# Patient Record
Sex: FEMALE | Employment: OTHER | URBAN - METROPOLITAN AREA
[De-identification: names, ages, dates, MRNs, and addresses within clinical notes are randomized per-mention and may not be internally consistent; named-entity substitution may affect disease eponyms.]

---

## 2023-07-11 ENCOUNTER — OFFICE VISIT (OUTPATIENT)
Dept: PRIMARY CARE | Facility: CLINIC | Age: 88
End: 2023-07-11
Payer: MEDICARE

## 2023-07-11 VITALS
DIASTOLIC BLOOD PRESSURE: 70 MMHG | BODY MASS INDEX: 19.44 KG/M2 | RESPIRATION RATE: 14 BRPM | SYSTOLIC BLOOD PRESSURE: 140 MMHG | WEIGHT: 99 LBS | HEART RATE: 74 BPM | HEIGHT: 60 IN

## 2023-07-11 DIAGNOSIS — T14.8XXA SKIN ABRASION: ICD-10-CM

## 2023-07-11 DIAGNOSIS — E78.89 ELEVATED HIGH-DENSITY LIPOPROTEIN: ICD-10-CM

## 2023-07-11 DIAGNOSIS — H61.22 IMPACTED CERUMEN OF LEFT EAR: Primary | ICD-10-CM

## 2023-07-11 DIAGNOSIS — Z66 DNR (DO NOT RESUSCITATE): ICD-10-CM

## 2023-07-11 DIAGNOSIS — M81.0 OSTEOPOROSIS WITHOUT CURRENT PATHOLOGICAL FRACTURE, UNSPECIFIED OSTEOPOROSIS TYPE: ICD-10-CM

## 2023-07-11 DIAGNOSIS — Z91.81 AT RISK FOR FALLS: ICD-10-CM

## 2023-07-11 DIAGNOSIS — E03.8 SUBCLINICAL HYPOTHYROIDISM: ICD-10-CM

## 2023-07-11 DIAGNOSIS — R55 VASOVAGAL SYNCOPE: ICD-10-CM

## 2023-07-11 DIAGNOSIS — Z00.00 ROUTINE GENERAL MEDICAL EXAMINATION AT A HEALTH CARE FACILITY: ICD-10-CM

## 2023-07-11 PROBLEM — F41.9 ANXIETY: Status: ACTIVE | Noted: 2023-07-11

## 2023-07-11 PROBLEM — M25.539 PAIN, WRIST JOINT: Status: RESOLVED | Noted: 2023-07-11 | Resolved: 2023-07-11

## 2023-07-11 PROBLEM — K59.00 CONSTIPATION: Status: ACTIVE | Noted: 2023-07-11

## 2023-07-11 PROBLEM — K57.90 DIVERTICULOSIS: Status: RESOLVED | Noted: 2023-07-11 | Resolved: 2023-07-11

## 2023-07-11 PROBLEM — J32.9 SINUSITIS: Status: RESOLVED | Noted: 2023-07-11 | Resolved: 2023-07-11

## 2023-07-11 PROBLEM — R07.89 ATYPICAL CHEST PAIN: Status: ACTIVE | Noted: 2023-07-11

## 2023-07-11 PROBLEM — R14.1 ABDOMINAL GAS PAIN: Status: RESOLVED | Noted: 2023-07-11 | Resolved: 2023-07-11

## 2023-07-11 PROBLEM — R07.89 CHEST HEAVINESS: Status: RESOLVED | Noted: 2023-07-11 | Resolved: 2023-07-11

## 2023-07-11 PROBLEM — D64.9 ANEMIA: Status: ACTIVE | Noted: 2023-07-11

## 2023-07-11 PROBLEM — R50.9 FEVER: Status: RESOLVED | Noted: 2023-07-11 | Resolved: 2023-07-11

## 2023-07-11 PROBLEM — R35.0 FREQUENT URINATION: Status: RESOLVED | Noted: 2023-07-11 | Resolved: 2023-07-11

## 2023-07-11 PROBLEM — K04.7 DENTAL ABSCESS: Status: RESOLVED | Noted: 2023-07-11 | Resolved: 2023-07-11

## 2023-07-11 PROBLEM — M19.039 PRIMARY LOCALIZED OSTEOARTHROSIS, FOREARM: Status: ACTIVE | Noted: 2023-07-11

## 2023-07-11 PROBLEM — E78.5 HYPERLIPIDEMIA: Status: ACTIVE | Noted: 2023-07-11

## 2023-07-11 PROBLEM — R53.83 FATIGUE: Status: RESOLVED | Noted: 2023-07-11 | Resolved: 2023-07-11

## 2023-07-11 PROBLEM — M67.40 GANGLION: Status: ACTIVE | Noted: 2023-07-11

## 2023-07-11 PROBLEM — E87.1 HYPONATREMIA: Status: ACTIVE | Noted: 2023-07-11

## 2023-07-11 PROBLEM — S51.812A LACERATION OF LEFT FOREARM: Status: RESOLVED | Noted: 2023-07-11 | Resolved: 2023-07-11

## 2023-07-11 PROBLEM — R06.09 DYSPNEA ON EXERTION: Status: ACTIVE | Noted: 2023-07-11

## 2023-07-11 PROBLEM — N39.0 URINARY TRACT INFECTION: Status: RESOLVED | Noted: 2023-07-11 | Resolved: 2023-07-11

## 2023-07-11 PROBLEM — S63.509A WRIST SPRAIN: Status: RESOLVED | Noted: 2023-07-11 | Resolved: 2023-07-11

## 2023-07-11 PROBLEM — E55.9 VITAMIN D DEFICIENCY: Status: ACTIVE | Noted: 2023-07-11

## 2023-07-11 PROBLEM — H61.23 BILATERAL IMPACTED CERUMEN: Status: RESOLVED | Noted: 2023-07-11 | Resolved: 2023-07-11

## 2023-07-11 PROBLEM — K57.32 DIVERTICULITIS OF COLON: Status: ACTIVE | Noted: 2023-07-11

## 2023-07-11 PROCEDURE — 99397 PER PM REEVAL EST PAT 65+ YR: CPT | Performed by: INTERNAL MEDICINE

## 2023-07-11 PROCEDURE — 1036F TOBACCO NON-USER: CPT | Performed by: INTERNAL MEDICINE

## 2023-07-11 PROCEDURE — 1126F AMNT PAIN NOTED NONE PRSNT: CPT | Performed by: INTERNAL MEDICINE

## 2023-07-11 PROCEDURE — 1160F RVW MEDS BY RX/DR IN RCRD: CPT | Performed by: INTERNAL MEDICINE

## 2023-07-11 PROCEDURE — G0439 PPPS, SUBSEQ VISIT: HCPCS | Performed by: INTERNAL MEDICINE

## 2023-07-11 PROCEDURE — 69210 REMOVE IMPACTED EAR WAX UNI: CPT | Performed by: INTERNAL MEDICINE

## 2023-07-11 PROCEDURE — 1159F MED LIST DOCD IN RCRD: CPT | Performed by: INTERNAL MEDICINE

## 2023-07-11 PROCEDURE — 1157F ADVNC CARE PLAN IN RCRD: CPT | Performed by: INTERNAL MEDICINE

## 2023-07-11 PROCEDURE — 99214 OFFICE O/P EST MOD 30 MIN: CPT | Performed by: INTERNAL MEDICINE

## 2023-07-11 PROCEDURE — 1170F FXNL STATUS ASSESSED: CPT | Performed by: INTERNAL MEDICINE

## 2023-07-11 PROCEDURE — 93000 ELECTROCARDIOGRAM COMPLETE: CPT | Performed by: INTERNAL MEDICINE

## 2023-07-11 RX ORDER — THYROID 60 MG/1
60 TABLET ORAL
COMMUNITY
Start: 2018-01-15

## 2023-07-11 RX ORDER — CHOLECALCIFEROL (VITAMIN D3) 50 MCG
1 TABLET ORAL DAILY
COMMUNITY
Start: 2014-02-05

## 2023-07-11 RX ORDER — THYROID 60 MG/1
60 TABLET ORAL
COMMUNITY
Start: 2023-06-15

## 2023-07-11 RX ORDER — VITAMIN B COMPLEX
1 CAPSULE ORAL DAILY
COMMUNITY
Start: 2015-03-17

## 2023-07-11 RX ORDER — CALCIUM CARBONATE 300MG(750)
400 TABLET,CHEWABLE ORAL DAILY
COMMUNITY
Start: 2017-08-24

## 2023-07-11 RX ORDER — OMEGA-3/DHA/EPA/FISH OIL 300-1000MG
1 CAPSULE,DELAYED RELEASE (ENTERIC COATED) ORAL 2 TIMES DAILY
COMMUNITY
Start: 2015-06-29

## 2023-07-11 RX ORDER — LATANOPROST 50 UG/ML
SOLUTION/ DROPS OPHTHALMIC
COMMUNITY
Start: 2023-06-23

## 2023-07-11 RX ORDER — EPINEPHRINE 0.22MG
100 AEROSOL WITH ADAPTER (ML) INHALATION DAILY
COMMUNITY
Start: 2015-06-29

## 2023-07-11 ASSESSMENT — ACTIVITIES OF DAILY LIVING (ADL)
TAKING_MEDICATION: INDEPENDENT
MANAGING_FINANCES: INDEPENDENT
DRESSING: INDEPENDENT
GROCERY_SHOPPING: INDEPENDENT
DOING_HOUSEWORK: NEEDS ASSISTANCE
BATHING: INDEPENDENT

## 2023-07-11 ASSESSMENT — ENCOUNTER SYMPTOMS
OCCASIONAL FEELINGS OF UNSTEADINESS: 0
DEPRESSION: 0
LOSS OF SENSATION IN FEET: 0

## 2023-09-06 PROBLEM — H61.22 IMPACTED CERUMEN OF LEFT EAR: Status: ACTIVE | Noted: 2023-09-06

## 2023-09-06 PROBLEM — Z00.00 ROUTINE GENERAL MEDICAL EXAMINATION AT A HEALTH CARE FACILITY: Status: ACTIVE | Noted: 2023-09-06

## 2023-09-06 PROBLEM — T14.8XXA SKIN ABRASION: Status: ACTIVE | Noted: 2023-09-06

## 2023-09-06 PROBLEM — Z91.81 AT RISK FOR FALLS: Status: ACTIVE | Noted: 2023-09-06

## 2023-09-06 ASSESSMENT — ENCOUNTER SYMPTOMS
POLYDIPSIA: 0
DYSURIA: 0
ENDOCRINE NEGATIVE: 1
NEUROLOGICAL NEGATIVE: 1
DYSPHORIC MOOD: 0
ABDOMINAL PAIN: 0
VOMITING: 0
FEVER: 0
SORE THROAT: 0
DIZZINESS: 0
HEADACHES: 0
SPEECH DIFFICULTY: 0
BACK PAIN: 0
POLYPHAGIA: 0
GASTROINTESTINAL NEGATIVE: 1
TROUBLE SWALLOWING: 0
BRUISES/BLEEDS EASILY: 0
COUGH: 0
SHORTNESS OF BREATH: 0
NAUSEA: 0
ARTHRALGIAS: 0
FREQUENCY: 0
VOICE CHANGE: 0
CARDIOVASCULAR NEGATIVE: 1
UNEXPECTED WEIGHT CHANGE: 0
TREMORS: 0
PALPITATIONS: 0
APNEA: 0
HEMATURIA: 0
NUMBNESS: 0
WEAKNESS: 0
NERVOUS/ANXIOUS: 0
DECREASED CONCENTRATION: 0
SLEEP DISTURBANCE: 0
RESPIRATORY NEGATIVE: 1
FATIGUE: 0
CONFUSION: 0
MUSCULOSKELETAL NEGATIVE: 1
WOUND: 0
CONSTIPATION: 0
DIARRHEA: 0
EYES NEGATIVE: 1
LIGHT-HEADEDNESS: 0

## 2023-09-06 ASSESSMENT — PATIENT HEALTH QUESTIONNAIRE - PHQ9
2. FEELING DOWN, DEPRESSED OR HOPELESS: NOT AT ALL
1. LITTLE INTEREST OR PLEASURE IN DOING THINGS: NOT AT ALL
SUM OF ALL RESPONSES TO PHQ9 QUESTIONS 1 AND 2: 0

## 2023-09-07 NOTE — PROGRESS NOTES
Subjective   Reason for Visit: Porsha Severino is an 89 y.o. female here for a Medicare Wellness visit.               HPI  Falls 7/14, 5  Urinary urge  hand tingling fourth finger swollen  Drop off step off curb  Elbow scrape  Left hip tender  [facility] at Home    Patient Care Team:  Armani Loyd MD as PCP - General  Armani Loyd MD as PCP - [employer] PCP     Review of Systems   Constitutional:  Negative for fatigue, fever and unexpected weight change.   HENT:  Negative for dental problem, hearing loss, sore throat, tinnitus, trouble swallowing and voice change.         Dental up-to-date, hearing aids   Eyes: Negative.  Negative for visual disturbance.        Eyeglasses exam up-to-date Dr. Muniz   Respiratory: Negative.  Negative for apnea, cough and shortness of breath.    Cardiovascular: Negative.  Negative for chest pain, palpitations and leg swelling.   Gastrointestinal: Negative.  Negative for abdominal pain, constipation, diarrhea, nausea and vomiting.   Endocrine: Negative.  Negative for polydipsia, polyphagia and polyuria.   Genitourinary: Negative.  Negative for dysuria, frequency, hematuria and urgency.        Incontinence and nocturia, wears pads   Musculoskeletal: Negative.  Negative for arthralgias, back pain and gait problem.   Skin: Negative.  Negative for rash and wound.        Dermatology Dr. Leonard   Allergic/Immunologic: Negative for immunocompromised state.   Neurological: Negative.  Negative for dizziness, tremors, speech difficulty, weakness, light-headedness, numbness and headaches.   Hematological:  Does not bruise/bleed easily.   Psychiatric/Behavioral:  Negative for confusion, decreased concentration, dysphoric mood and sleep disturbance. The patient is not nervous/anxious.        Objective   Vitals:  /70   Pulse 74   Resp 14   Ht 1.524 m (5')   Wt (!) 44.9 kg (99 lb)   BMI 19.33 kg/m²       Physical Exam  Constitutional:       General: She is not in acute  distress.     Appearance: Normal appearance. She is normal weight.   HENT:      Head: Normocephalic.      Right Ear: Hearing and tympanic membrane normal.      Left Ear: Hearing and tympanic membrane normal.      Ears:      Comments: Hearing intact speech and finger rub hearing aids  Cerumen left ear, impacted, cleared     Nose: Nose normal.      Mouth/Throat:      Mouth: Mucous membranes are moist.      Pharynx: Oropharynx is clear.   Eyes:      General: No scleral icterus.     Extraocular Movements: Extraocular movements intact.      Conjunctiva/sclera: Conjunctivae normal.   Neck:      Thyroid: No thyromegaly.      Vascular: No carotid bruit or JVD.   Cardiovascular:      Rate and Rhythm: Normal rate and regular rhythm.      Pulses: Normal pulses.      Heart sounds: Normal heart sounds. No murmur heard.  Pulmonary:      Effort: Pulmonary effort is normal.      Breath sounds: Normal breath sounds. No wheezing, rhonchi or rales.   Abdominal:      General: Abdomen is flat. Bowel sounds are normal. There is no distension.      Palpations: Abdomen is soft. There is no mass.      Tenderness: There is no abdominal tenderness. There is no right CVA tenderness, left CVA tenderness or guarding.      Hernia: No hernia is present.      Comments: Abdominal surgical scar   Musculoskeletal:         General: Normal range of motion.      Cervical back: Full passive range of motion without pain and normal range of motion. No tenderness.      Right lower leg: No edema.      Left lower leg: No edema.      Comments: Joints F ROM, T0 L0 S0   Lymphadenopathy:      Cervical: No cervical adenopathy.   Skin:     General: Skin is warm.      Capillary Refill: Capillary refill takes less than 2 seconds.      Findings: No lesion or rash.      Comments: Superficial dry abrasion left knee and elbow  Soft tissue nodule left upper arm mobile nontender   Neurological:      General: No focal deficit present.      Mental Status: She is alert and  oriented to person, place, and time.      Cranial Nerves: No cranial nerve deficit.      Sensory: No sensory deficit.      Motor: No weakness.      Coordination: Coordination normal.      Gait: Gait normal.      Deep Tendon Reflexes: Reflexes normal.   Psychiatric:         Mood and Affect: Mood normal.         Behavior: Behavior normal.         Thought Content: Thought content normal.       Data  Electrocardiogram normal sinus rhythm  Laboratory November and December 2022 reviewed  CT angio coronary arteries 4/20/2021  Stress echocardiogram February 2020  Echocardiogram February 2020  Ambulatory heart monitor 2017-normal  Colonoscopy December 2008  Bone densitometry December 2019 osteoporosis  Mammogram July 2013  Colonoscopy 12/2/2008  Geriatrics evaluation 2014    Assessment/Plan     No clinical evidence of active heart disease, cardiac risk counseling  Age and gender appropriate cancer screening and prevention reviewed, deferred mammogram  Immunizations reviewed, declined  At risk for falls with osteoporosis-reviewed falls precautions, maintain adequate calcium and vitamin D intake weightbearing exercise  Subclinical hypothyroidism-clinically euthyroid  Cerumen impaction resolved  Problem List Items Addressed This Visit       DNR (do not resuscitate)    Elevated high-density lipoprotein    Osteoporosis    Subclinical hypothyroidism    Vasovagal syncope    Relevant Orders    ECG 12 lead (Clinic Performed) (Completed)    Routine general medical examination at a health care facility - Primary    At risk for falls    Impacted cerumen of left ear    Skin abrasion     Patient ID: Porsha Severino is a 89 y.o. female.    Ear Cerumen Removal    Date/Time: 7/11/2023 2:00 PM    Performed by: Armani Loyd MD  Authorized by: Armani Loyd MD    Consent:     Consent obtained:  Verbal  Procedure details:     Location:  L ear    Procedure type: curette      Procedure type comment:  Use of curette and irrigation    Procedure  outcomes: cerumen removed    Post-procedure details:     Inspection:  Ear canal clear    Hearing quality:  Improved    Procedure completion:  Tolerated

## 2023-12-05 ENCOUNTER — LAB (OUTPATIENT)
Dept: LAB | Facility: LAB | Age: 88
End: 2023-12-05
Payer: MEDICARE

## 2023-12-05 DIAGNOSIS — R53.83 OTHER FATIGUE: Primary | ICD-10-CM

## 2023-12-05 DIAGNOSIS — E55.9 VITAMIN D DEFICIENCY, UNSPECIFIED: ICD-10-CM

## 2023-12-05 DIAGNOSIS — N95.9 UNSPECIFIED MENOPAUSAL AND PERIMENOPAUSAL DISORDER: ICD-10-CM

## 2023-12-05 DIAGNOSIS — R52 PAIN, UNSPECIFIED: ICD-10-CM

## 2023-12-05 DIAGNOSIS — M19.90 UNSPECIFIED OSTEOARTHRITIS, UNSPECIFIED SITE: ICD-10-CM

## 2023-12-05 DIAGNOSIS — E03.9 HYPOTHYROIDISM, UNSPECIFIED: ICD-10-CM

## 2024-01-22 ENCOUNTER — TELEPHONE (OUTPATIENT)
Dept: PRIMARY CARE | Facility: CLINIC | Age: 89
End: 2024-01-22
Payer: MEDICARE

## 2024-01-23 DIAGNOSIS — N30.00 ACUTE CYSTITIS WITHOUT HEMATURIA: Primary | ICD-10-CM

## 2024-02-20 ENCOUNTER — LAB (OUTPATIENT)
Dept: LAB | Facility: LAB | Age: 89
End: 2024-02-20
Payer: MEDICARE

## 2024-02-20 DIAGNOSIS — E55.9 VITAMIN D DEFICIENCY, UNSPECIFIED: ICD-10-CM

## 2024-02-20 DIAGNOSIS — R53.83 OTHER FATIGUE: ICD-10-CM

## 2024-02-20 DIAGNOSIS — N95.9 UNSPECIFIED MENOPAUSAL AND PERIMENOPAUSAL DISORDER: ICD-10-CM

## 2024-02-20 DIAGNOSIS — E03.9 HYPOTHYROIDISM, UNSPECIFIED: ICD-10-CM

## 2024-02-20 DIAGNOSIS — R52 PAIN, UNSPECIFIED: ICD-10-CM

## 2024-02-20 DIAGNOSIS — M19.90 UNSPECIFIED OSTEOARTHRITIS, UNSPECIFIED SITE: ICD-10-CM

## 2024-02-20 DIAGNOSIS — N30.00 ACUTE CYSTITIS WITHOUT HEMATURIA: ICD-10-CM

## 2024-02-20 LAB
25(OH)D3 SERPL-MCNC: 39 NG/ML (ref 30–100)
ALBUMIN SERPL BCP-MCNC: 4.2 G/DL (ref 3.4–5)
ALP SERPL-CCNC: 96 U/L (ref 33–136)
ALT SERPL W P-5'-P-CCNC: 11 U/L (ref 7–45)
ANION GAP SERPL CALC-SCNC: 13 MMOL/L (ref 10–20)
APPEARANCE UR: CLEAR
AST SERPL W P-5'-P-CCNC: 17 U/L (ref 9–39)
BASOPHILS # BLD AUTO: 0.04 X10*3/UL (ref 0–0.1)
BASOPHILS NFR BLD AUTO: 0.8 %
BILIRUB SERPL-MCNC: 0.7 MG/DL (ref 0–1.2)
BILIRUB UR STRIP.AUTO-MCNC: NEGATIVE MG/DL
BUN SERPL-MCNC: 16 MG/DL (ref 6–23)
CA-I BLD-SCNC: 1.16 MMOL/L (ref 1.1–1.33)
CALCIUM SERPL-MCNC: 9.1 MG/DL (ref 8.6–10.6)
CHLORIDE SERPL-SCNC: 99 MMOL/L (ref 98–107)
CHOLEST SERPL-MCNC: 245 MG/DL (ref 0–199)
CHOLESTEROL/HDL RATIO: 2.5
CO2 SERPL-SCNC: 27 MMOL/L (ref 21–32)
COLOR UR: NORMAL
CORTIS SERPL-MCNC: 13.6 UG/DL (ref 2.5–20)
CREAT SERPL-MCNC: 0.81 MG/DL (ref 0.5–1.05)
CRP SERPL HS-MCNC: 4.2 MG/L
DHEA-S SERPL-MCNC: 62 UG/DL (ref 13–130)
EGFRCR SERPLBLD CKD-EPI 2021: 69 ML/MIN/1.73M*2
EOSINOPHIL # BLD AUTO: 0.13 X10*3/UL (ref 0–0.4)
EOSINOPHIL NFR BLD AUTO: 2.7 %
ERYTHROCYTE [DISTWIDTH] IN BLOOD BY AUTOMATED COUNT: 12.8 % (ref 11.5–14.5)
EST. AVERAGE GLUCOSE BLD GHB EST-MCNC: 114 MG/DL
ESTRADIOL SERPL-MCNC: <19 PG/ML
FERRITIN SERPL-MCNC: 54 NG/ML (ref 8–150)
FOLATE SERPL-MCNC: 18.6 NG/ML
GLUCOSE SERPL-MCNC: 88 MG/DL (ref 74–99)
GLUCOSE UR STRIP.AUTO-MCNC: NORMAL MG/DL
HBA1C MFR BLD: 5.6 %
HCT VFR BLD AUTO: 38.2 % (ref 36–46)
HCYS SERPL-SCNC: 16.49 UMOL/L (ref 5–13.9)
HDLC SERPL-MCNC: 99.2 MG/DL
HGB BLD-MCNC: 12 G/DL (ref 12–16)
IMM GRANULOCYTES # BLD AUTO: 0.01 X10*3/UL (ref 0–0.5)
IMM GRANULOCYTES NFR BLD AUTO: 0.2 % (ref 0–0.9)
INSULIN SERPL-ACNC: 5 UIU/ML (ref 3–25)
IRON SATN MFR SERPL: 19 % (ref 25–45)
IRON SERPL-MCNC: 73 UG/DL (ref 35–150)
KETONES UR STRIP.AUTO-MCNC: NEGATIVE MG/DL
LDLC SERPL CALC-MCNC: 130 MG/DL
LEUKOCYTE ESTERASE UR QL STRIP.AUTO: NEGATIVE
LYMPHOCYTES # BLD AUTO: 0.78 X10*3/UL (ref 0.8–3)
LYMPHOCYTES NFR BLD AUTO: 16 %
MAGNESIUM SERPL-MCNC: 2.04 MG/DL (ref 1.6–2.4)
MCH RBC QN AUTO: 27.7 PG (ref 26–34)
MCHC RBC AUTO-ENTMCNC: 31.4 G/DL (ref 32–36)
MCV RBC AUTO: 88 FL (ref 80–100)
MONOCYTES # BLD AUTO: 0.39 X10*3/UL (ref 0.05–0.8)
MONOCYTES NFR BLD AUTO: 8 %
NEUTROPHILS # BLD AUTO: 3.51 X10*3/UL (ref 1.6–5.5)
NEUTROPHILS NFR BLD AUTO: 72.3 %
NITRITE UR QL STRIP.AUTO: NEGATIVE
NON HDL CHOLESTEROL: 146 MG/DL (ref 0–149)
NRBC BLD-RTO: 0 /100 WBCS (ref 0–0)
PH UR STRIP.AUTO: 6.5 [PH]
PLATELET # BLD AUTO: 324 X10*3/UL (ref 150–450)
POTASSIUM SERPL-SCNC: 3.9 MMOL/L (ref 3.5–5.3)
PROGEST SERPL-MCNC: <0.3 NG/ML
PROT SERPL-MCNC: 6.6 G/DL (ref 6.4–8.2)
PROT UR STRIP.AUTO-MCNC: NEGATIVE MG/DL
RBC # BLD AUTO: 4.33 X10*6/UL (ref 4–5.2)
RBC # UR STRIP.AUTO: NEGATIVE /UL
SODIUM SERPL-SCNC: 135 MMOL/L (ref 136–145)
SP GR UR STRIP.AUTO: 1.01
T3FREE SERPL-MCNC: 5.1 PG/ML (ref 2.3–4.2)
T4 FREE SERPL-MCNC: 1.05 NG/DL (ref 0.78–1.48)
THYROPEROXIDASE AB SERPL-ACNC: <28 IU/ML
TIBC SERPL-MCNC: 389 UG/DL (ref 240–445)
TRIGL SERPL-MCNC: 79 MG/DL (ref 0–149)
TSH SERPL-ACNC: 0.07 MIU/L (ref 0.44–3.98)
UIBC SERPL-MCNC: 316 UG/DL (ref 110–370)
URATE SERPL-MCNC: 4.1 MG/DL (ref 2.3–6.7)
UROBILINOGEN UR STRIP.AUTO-MCNC: NORMAL MG/DL
VIT B12 SERPL-MCNC: 559 PG/ML (ref 211–911)
VLDL: 16 MG/DL (ref 0–40)
WBC # BLD AUTO: 4.9 X10*3/UL (ref 4.4–11.3)

## 2024-02-20 PROCEDURE — 83550 IRON BINDING TEST: CPT

## 2024-02-20 PROCEDURE — 82533 TOTAL CORTISOL: CPT

## 2024-02-20 PROCEDURE — 83090 ASSAY OF HOMOCYSTEINE: CPT

## 2024-02-20 PROCEDURE — 86376 MICROSOMAL ANTIBODY EACH: CPT

## 2024-02-20 PROCEDURE — 82330 ASSAY OF CALCIUM: CPT

## 2024-02-20 PROCEDURE — 83036 HEMOGLOBIN GLYCOSYLATED A1C: CPT

## 2024-02-20 PROCEDURE — 83735 ASSAY OF MAGNESIUM: CPT

## 2024-02-20 PROCEDURE — 86800 THYROGLOBULIN ANTIBODY: CPT

## 2024-02-20 PROCEDURE — 86141 C-REACTIVE PROTEIN HS: CPT

## 2024-02-20 PROCEDURE — 81003 URINALYSIS AUTO W/O SCOPE: CPT

## 2024-02-20 PROCEDURE — 82746 ASSAY OF FOLIC ACID SERUM: CPT

## 2024-02-20 PROCEDURE — 83525 ASSAY OF INSULIN: CPT

## 2024-02-20 PROCEDURE — 82607 VITAMIN B-12: CPT

## 2024-02-20 PROCEDURE — 85025 COMPLETE CBC W/AUTO DIFF WBC: CPT

## 2024-02-20 PROCEDURE — 84550 ASSAY OF BLOOD/URIC ACID: CPT

## 2024-02-20 PROCEDURE — 80053 COMPREHEN METABOLIC PANEL: CPT

## 2024-02-20 PROCEDURE — 84402 ASSAY OF FREE TESTOSTERONE: CPT

## 2024-02-20 PROCEDURE — 84481 FREE ASSAY (FT-3): CPT

## 2024-02-20 PROCEDURE — 84144 ASSAY OF PROGESTERONE: CPT

## 2024-02-20 PROCEDURE — 80061 LIPID PANEL: CPT

## 2024-02-20 PROCEDURE — 83540 ASSAY OF IRON: CPT

## 2024-02-20 PROCEDURE — 82728 ASSAY OF FERRITIN: CPT

## 2024-02-20 PROCEDURE — 82306 VITAMIN D 25 HYDROXY: CPT

## 2024-02-20 PROCEDURE — 84270 ASSAY OF SEX HORMONE GLOBUL: CPT

## 2024-02-20 PROCEDURE — 84432 ASSAY OF THYROGLOBULIN: CPT

## 2024-02-20 PROCEDURE — 82172 ASSAY OF APOLIPOPROTEIN: CPT

## 2024-02-20 PROCEDURE — 36415 COLL VENOUS BLD VENIPUNCTURE: CPT

## 2024-02-20 PROCEDURE — 84482 T3 REVERSE: CPT

## 2024-02-20 PROCEDURE — 84439 ASSAY OF FREE THYROXINE: CPT

## 2024-02-20 PROCEDURE — 84305 ASSAY OF SOMATOMEDIN: CPT

## 2024-02-20 PROCEDURE — 82627 DEHYDROEPIANDROSTERONE: CPT

## 2024-02-20 PROCEDURE — 82670 ASSAY OF TOTAL ESTRADIOL: CPT

## 2024-02-20 PROCEDURE — 84443 ASSAY THYROID STIM HORMONE: CPT

## 2024-02-21 LAB
LPA SERPL-MCNC: 145 MG/DL
SHBG SERPL-SCNC: 139 NMOL/L (ref 17–125)

## 2024-02-22 LAB
BILL ONLY-THYROGLOBULIN: NORMAL
IGF-I SERPL-MCNC: 148 NG/ML
IGF-I Z-SCORE SERPL: NORMAL
THYROGLOB AB SERPL-ACNC: <0.9 IU/ML (ref 0–4)
THYROGLOB SERPL-MCNC: 3.5 NG/ML (ref 1.3–31.8)
THYROGLOB SERPL-MCNC: NORMAL NG/ML (ref 1.3–31.8)

## 2024-02-24 LAB
TESTOSTERONE FREE (CHAN): 1.2 PG/ML
TESTOSTERONE,TOTAL,LC-MS/MS: 18 NG/DL (ref 2–45)

## 2024-02-25 LAB — T3REVERSE SERPL-MCNC: 13.9 NG/DL (ref 9–27)

## 2024-07-23 DIAGNOSIS — D64.9 ANEMIA, UNSPECIFIED TYPE: Primary | ICD-10-CM

## 2024-07-23 RX ORDER — THYROID 60 MG/1
60 TABLET ORAL
Qty: 90 TABLET | Refills: 3 | Status: SHIPPED | OUTPATIENT
Start: 2024-07-23

## 2024-09-09 ENCOUNTER — APPOINTMENT (OUTPATIENT)
Dept: RADIOLOGY | Facility: HOSPITAL | Age: 89
End: 2024-09-09
Payer: MEDICARE

## 2024-09-09 ENCOUNTER — HOSPITAL ENCOUNTER (OUTPATIENT)
Facility: HOSPITAL | Age: 89
Setting detail: OBSERVATION
Discharge: NURSING, REHAB OR LONG-TERM CARE | End: 2024-09-10
Attending: STUDENT IN AN ORGANIZED HEALTH CARE EDUCATION/TRAINING PROGRAM | Admitting: STUDENT IN AN ORGANIZED HEALTH CARE EDUCATION/TRAINING PROGRAM
Payer: MEDICARE

## 2024-09-09 ENCOUNTER — APPOINTMENT (OUTPATIENT)
Dept: CARDIOLOGY | Facility: HOSPITAL | Age: 89
End: 2024-09-09
Payer: MEDICARE

## 2024-09-09 DIAGNOSIS — R47.01 APHASIA: ICD-10-CM

## 2024-09-09 DIAGNOSIS — R41.82 ALTERED MENTAL STATUS, UNSPECIFIED ALTERED MENTAL STATUS TYPE: Primary | ICD-10-CM

## 2024-09-09 DIAGNOSIS — E87.1 HYPONATREMIA: ICD-10-CM

## 2024-09-09 DIAGNOSIS — G45.8 OTHER TRANSIENT CEREBRAL ISCHEMIC ATTACKS AND RELATED SYNDROMES: ICD-10-CM

## 2024-09-09 LAB
ALBUMIN SERPL BCP-MCNC: 3.8 G/DL (ref 3.4–5)
ALP SERPL-CCNC: 78 U/L (ref 33–136)
ALT SERPL W P-5'-P-CCNC: 11 U/L (ref 7–45)
ANION GAP SERPL CALC-SCNC: 16 MMOL/L (ref 10–20)
APPEARANCE UR: CLEAR
AST SERPL W P-5'-P-CCNC: 15 U/L (ref 9–39)
BASOPHILS # BLD AUTO: 0.03 X10*3/UL (ref 0–0.1)
BASOPHILS NFR BLD AUTO: 0.5 %
BILIRUB SERPL-MCNC: 1.3 MG/DL (ref 0–1.2)
BILIRUB UR STRIP.AUTO-MCNC: NEGATIVE MG/DL
BUN SERPL-MCNC: 18 MG/DL (ref 6–23)
CALCIUM SERPL-MCNC: 8.9 MG/DL (ref 8.6–10.3)
CHLORIDE SERPL-SCNC: 94 MMOL/L (ref 98–107)
CHLORIDE UR-SCNC: 38 MMOL/L
CHLORIDE/CREATININE (MMOL/G) IN URINE: 42 MMOL/G CREAT (ref 38–318)
CO2 SERPL-SCNC: 22 MMOL/L (ref 21–32)
COLOR UR: ABNORMAL
CREAT SERPL-MCNC: 0.97 MG/DL (ref 0.5–1.05)
CREAT UR-MCNC: 90.4 MG/DL (ref 20–320)
EGFRCR SERPLBLD CKD-EPI 2021: 55 ML/MIN/1.73M*2
EOSINOPHIL # BLD AUTO: 0.02 X10*3/UL (ref 0–0.4)
EOSINOPHIL NFR BLD AUTO: 0.3 %
ERYTHROCYTE [DISTWIDTH] IN BLOOD BY AUTOMATED COUNT: 12.5 % (ref 11.5–14.5)
FLUAV RNA RESP QL NAA+PROBE: NOT DETECTED
FLUBV RNA RESP QL NAA+PROBE: NOT DETECTED
GLUCOSE SERPL-MCNC: 115 MG/DL (ref 74–99)
GLUCOSE UR STRIP.AUTO-MCNC: NORMAL MG/DL
HCT VFR BLD AUTO: 36.4 % (ref 36–46)
HGB BLD-MCNC: 12.1 G/DL (ref 12–16)
HYALINE CASTS #/AREA URNS AUTO: ABNORMAL /LPF
IMM GRANULOCYTES # BLD AUTO: 0.01 X10*3/UL (ref 0–0.5)
IMM GRANULOCYTES NFR BLD AUTO: 0.2 % (ref 0–0.9)
KETONES UR STRIP.AUTO-MCNC: NEGATIVE MG/DL
LEUKOCYTE ESTERASE UR QL STRIP.AUTO: NEGATIVE
LYMPHOCYTES # BLD AUTO: 0.81 X10*3/UL (ref 0.8–3)
LYMPHOCYTES NFR BLD AUTO: 13.8 %
MAGNESIUM SERPL-MCNC: 2.1 MG/DL (ref 1.6–2.4)
MCH RBC QN AUTO: 28.7 PG (ref 26–34)
MCHC RBC AUTO-ENTMCNC: 33.2 G/DL (ref 32–36)
MCV RBC AUTO: 87 FL (ref 80–100)
MONOCYTES # BLD AUTO: 0.62 X10*3/UL (ref 0.05–0.8)
MONOCYTES NFR BLD AUTO: 10.5 %
NEUTROPHILS # BLD AUTO: 4.4 X10*3/UL (ref 1.6–5.5)
NEUTROPHILS NFR BLD AUTO: 74.7 %
NITRITE UR QL STRIP.AUTO: NEGATIVE
NRBC BLD-RTO: 0 /100 WBCS (ref 0–0)
OSMOLALITY SERPL: 275 MOSM/KG (ref 280–300)
PH UR STRIP.AUTO: 6.5 [PH]
PLATELET # BLD AUTO: 287 X10*3/UL (ref 150–450)
POTASSIUM SERPL-SCNC: 3.8 MMOL/L (ref 3.5–5.3)
POTASSIUM UR-SCNC: 45 MMOL/L
POTASSIUM/CREAT UR-RTO: 50 MMOL/G CREAT
PROT SERPL-MCNC: 6.1 G/DL (ref 6.4–8.2)
PROT UR STRIP.AUTO-MCNC: NEGATIVE MG/DL
RBC # BLD AUTO: 4.21 X10*6/UL (ref 4–5.2)
RBC # UR STRIP.AUTO: ABNORMAL /UL
RBC #/AREA URNS AUTO: ABNORMAL /HPF
SARS-COV-2 RNA RESP QL NAA+PROBE: NOT DETECTED
SODIUM SERPL-SCNC: 128 MMOL/L (ref 136–145)
SODIUM UR-SCNC: 48 MMOL/L
SODIUM/CREAT UR-RTO: 53 MMOL/G CREAT
SP GR UR STRIP.AUTO: 1.01
UROBILINOGEN UR STRIP.AUTO-MCNC: NORMAL MG/DL
WBC # BLD AUTO: 5.9 X10*3/UL (ref 4.4–11.3)
WBC #/AREA URNS AUTO: ABNORMAL /HPF

## 2024-09-09 PROCEDURE — G0378 HOSPITAL OBSERVATION PER HR: HCPCS

## 2024-09-09 PROCEDURE — 70450 CT HEAD/BRAIN W/O DYE: CPT

## 2024-09-09 PROCEDURE — 2500000004 HC RX 250 GENERAL PHARMACY W/ HCPCS (ALT 636 FOR OP/ED): Performed by: STUDENT IN AN ORGANIZED HEALTH CARE EDUCATION/TRAINING PROGRAM

## 2024-09-09 PROCEDURE — 70551 MRI BRAIN STEM W/O DYE: CPT | Performed by: STUDENT IN AN ORGANIZED HEALTH CARE EDUCATION/TRAINING PROGRAM

## 2024-09-09 PROCEDURE — 93005 ELECTROCARDIOGRAM TRACING: CPT

## 2024-09-09 PROCEDURE — 70544 MR ANGIOGRAPHY HEAD W/O DYE: CPT | Mod: 59

## 2024-09-09 PROCEDURE — 81001 URINALYSIS AUTO W/SCOPE: CPT | Performed by: STUDENT IN AN ORGANIZED HEALTH CARE EDUCATION/TRAINING PROGRAM

## 2024-09-09 PROCEDURE — 85025 COMPLETE CBC W/AUTO DIFF WBC: CPT | Performed by: STUDENT IN AN ORGANIZED HEALTH CARE EDUCATION/TRAINING PROGRAM

## 2024-09-09 PROCEDURE — 83735 ASSAY OF MAGNESIUM: CPT | Performed by: STUDENT IN AN ORGANIZED HEALTH CARE EDUCATION/TRAINING PROGRAM

## 2024-09-09 PROCEDURE — 70450 CT HEAD/BRAIN W/O DYE: CPT | Performed by: RADIOLOGY

## 2024-09-09 PROCEDURE — 70547 MR ANGIOGRAPHY NECK W/O DYE: CPT | Performed by: STUDENT IN AN ORGANIZED HEALTH CARE EDUCATION/TRAINING PROGRAM

## 2024-09-09 PROCEDURE — 2500000004 HC RX 250 GENERAL PHARMACY W/ HCPCS (ALT 636 FOR OP/ED): Performed by: NURSE PRACTITIONER

## 2024-09-09 PROCEDURE — 87636 SARSCOV2 & INF A&B AMP PRB: CPT | Performed by: STUDENT IN AN ORGANIZED HEALTH CARE EDUCATION/TRAINING PROGRAM

## 2024-09-09 PROCEDURE — 70551 MRI BRAIN STEM W/O DYE: CPT

## 2024-09-09 PROCEDURE — 71046 X-RAY EXAM CHEST 2 VIEWS: CPT

## 2024-09-09 PROCEDURE — 82436 ASSAY OF URINE CHLORIDE: CPT | Performed by: STUDENT IN AN ORGANIZED HEALTH CARE EDUCATION/TRAINING PROGRAM

## 2024-09-09 PROCEDURE — 71046 X-RAY EXAM CHEST 2 VIEWS: CPT | Performed by: RADIOLOGY

## 2024-09-09 PROCEDURE — 80053 COMPREHEN METABOLIC PANEL: CPT | Performed by: STUDENT IN AN ORGANIZED HEALTH CARE EDUCATION/TRAINING PROGRAM

## 2024-09-09 PROCEDURE — 83930 ASSAY OF BLOOD OSMOLALITY: CPT | Mod: AHULAB | Performed by: STUDENT IN AN ORGANIZED HEALTH CARE EDUCATION/TRAINING PROGRAM

## 2024-09-09 PROCEDURE — 99285 EMERGENCY DEPT VISIT HI MDM: CPT | Mod: 25

## 2024-09-09 PROCEDURE — 70547 MR ANGIOGRAPHY NECK W/O DYE: CPT

## 2024-09-09 PROCEDURE — 36415 COLL VENOUS BLD VENIPUNCTURE: CPT | Performed by: STUDENT IN AN ORGANIZED HEALTH CARE EDUCATION/TRAINING PROGRAM

## 2024-09-09 RX ORDER — ASPIRIN 81 MG/1
81 TABLET ORAL DAILY
Status: DISCONTINUED | OUTPATIENT
Start: 2024-09-10 | End: 2024-09-10 | Stop reason: HOSPADM

## 2024-09-09 RX ORDER — PANTOPRAZOLE SODIUM 40 MG/1
40 TABLET, DELAYED RELEASE ORAL
Status: DISCONTINUED | OUTPATIENT
Start: 2024-09-10 | End: 2024-09-10 | Stop reason: HOSPADM

## 2024-09-09 RX ORDER — ACETAMINOPHEN 160 MG/5ML
650 SOLUTION ORAL EVERY 4 HOURS PRN
Status: DISCONTINUED | OUTPATIENT
Start: 2024-09-09 | End: 2024-09-10 | Stop reason: HOSPADM

## 2024-09-09 RX ORDER — TIMOLOL MALEATE 5 MG/ML
1 SOLUTION/ DROPS OPHTHALMIC EVERY MORNING
Status: DISCONTINUED | OUTPATIENT
Start: 2024-09-10 | End: 2024-09-10 | Stop reason: HOSPADM

## 2024-09-09 RX ORDER — TIMOLOL MALEATE 5 MG/ML
1 SOLUTION/ DROPS OPHTHALMIC DAILY
COMMUNITY
Start: 2024-08-23

## 2024-09-09 RX ORDER — ONDANSETRON 4 MG/1
4 TABLET, FILM COATED ORAL EVERY 8 HOURS PRN
Status: DISCONTINUED | OUTPATIENT
Start: 2024-09-09 | End: 2024-09-10 | Stop reason: HOSPADM

## 2024-09-09 RX ORDER — ATORVASTATIN CALCIUM 40 MG/1
40 TABLET, FILM COATED ORAL NIGHTLY
Status: DISCONTINUED | OUTPATIENT
Start: 2024-09-09 | End: 2024-09-10 | Stop reason: HOSPADM

## 2024-09-09 RX ORDER — LATANOPROSTENE BUNOD 0.24 MG/ML
SOLUTION/ DROPS OPHTHALMIC
COMMUNITY
Start: 2024-06-14

## 2024-09-09 RX ORDER — ACETAMINOPHEN 325 MG/1
650 TABLET ORAL EVERY 4 HOURS PRN
Status: DISCONTINUED | OUTPATIENT
Start: 2024-09-09 | End: 2024-09-10 | Stop reason: HOSPADM

## 2024-09-09 RX ORDER — ONDANSETRON HYDROCHLORIDE 2 MG/ML
4 INJECTION, SOLUTION INTRAVENOUS EVERY 8 HOURS PRN
Status: DISCONTINUED | OUTPATIENT
Start: 2024-09-09 | End: 2024-09-10 | Stop reason: HOSPADM

## 2024-09-09 RX ORDER — ACETAMINOPHEN 650 MG/1
650 SUPPOSITORY RECTAL EVERY 4 HOURS PRN
Status: DISCONTINUED | OUTPATIENT
Start: 2024-09-09 | End: 2024-09-10 | Stop reason: HOSPADM

## 2024-09-09 RX ORDER — THYROID 30 MG/1
60 TABLET ORAL EVERY MORNING
Status: DISCONTINUED | OUTPATIENT
Start: 2024-09-10 | End: 2024-09-10 | Stop reason: HOSPADM

## 2024-09-09 RX ORDER — DOCUSATE SODIUM 100 MG/1
100 CAPSULE, LIQUID FILLED ORAL 2 TIMES DAILY
Status: DISCONTINUED | OUTPATIENT
Start: 2024-09-09 | End: 2024-09-10 | Stop reason: HOSPADM

## 2024-09-09 RX ORDER — SODIUM CHLORIDE, SODIUM LACTATE, POTASSIUM CHLORIDE, CALCIUM CHLORIDE 600; 310; 30; 20 MG/100ML; MG/100ML; MG/100ML; MG/100ML
75 INJECTION, SOLUTION INTRAVENOUS CONTINUOUS
Status: DISCONTINUED | OUTPATIENT
Start: 2024-09-09 | End: 2024-09-10

## 2024-09-09 RX ORDER — HEPARIN SODIUM 5000 [USP'U]/ML
5000 INJECTION, SOLUTION INTRAVENOUS; SUBCUTANEOUS EVERY 8 HOURS SCHEDULED
Status: DISCONTINUED | OUTPATIENT
Start: 2024-09-09 | End: 2024-09-10 | Stop reason: HOSPADM

## 2024-09-09 RX ORDER — POLYETHYLENE GLYCOL 3350 17 G/17G
17 POWDER, FOR SOLUTION ORAL DAILY PRN
Status: DISCONTINUED | OUTPATIENT
Start: 2024-09-09 | End: 2024-09-10 | Stop reason: HOSPADM

## 2024-09-09 RX ORDER — POLYETHYLENE GLYCOL 3350 17 G/17G
17 POWDER, FOR SOLUTION ORAL DAILY
Status: DISCONTINUED | OUTPATIENT
Start: 2024-09-09 | End: 2024-09-10 | Stop reason: HOSPADM

## 2024-09-09 ASSESSMENT — COLUMBIA-SUICIDE SEVERITY RATING SCALE - C-SSRS
1. IN THE PAST MONTH, HAVE YOU WISHED YOU WERE DEAD OR WISHED YOU COULD GO TO SLEEP AND NOT WAKE UP?: NO
6. HAVE YOU EVER DONE ANYTHING, STARTED TO DO ANYTHING, OR PREPARED TO DO ANYTHING TO END YOUR LIFE?: NO
2. HAVE YOU ACTUALLY HAD ANY THOUGHTS OF KILLING YOURSELF?: NO

## 2024-09-09 ASSESSMENT — PAIN - FUNCTIONAL ASSESSMENT: PAIN_FUNCTIONAL_ASSESSMENT: 0-10

## 2024-09-09 ASSESSMENT — PAIN SCALES - GENERAL
PAINLEVEL_OUTOF10: 0 - NO PAIN

## 2024-09-09 ASSESSMENT — ENCOUNTER SYMPTOMS: SPEECH DIFFICULTY: 1

## 2024-09-09 NOTE — ED TRIAGE NOTES
Pt arrived to ED with c/o difficulty finding words. EMS reports that family was on phone with pt and she was having difficulty finding the right words when trying to talk. Pt still having some problems with identifing objects but denies any other complaints. Pt alert and oriented x3 with some confusion.

## 2024-09-09 NOTE — H&P
"History Of Present Illness  Porsha Severino is a 89 y.o. female presenting with aphasia.  Patient was recently moved to assisted living about 2 weeks ago.  She is currently accompanied by her daughter who assists with obtaining history.  Daughter endorses that this morning the staff found patient naked in her bedroom and having difficulty getting dressed.  There is also concern that this happened yesterday.  Overall this difficulty getting dressed is new and just started.  Unable to specify what specifically what is the difficulty getting dressed.  This was between 7 to 9 AM.  Her daughter then called her on the phone around 230 today and noticed the patient was having difficulty speaking such as difficulty finding words and difficulty talking.  She also reports the patient was stuttering more and was potentially slightly slurred.  Once the daughter noticed that she immediately called the facility and requested that they check on the patient, to which she was sent to the hospital here.  The daughter also endorses she did have concerned that the patient did have a vasovagal syncopal event this past Wednesday, to which she has had for events in the past year.      On exam patient is alert, conversive, and cooperative with exam.  She is experiencing some aphasia.  While she is able to communicate, when asked certain questions such as where she is from, how many kids she has, ETC her response is moderately delayed and she does have difficulty responding.  Such as she will say \"I do not know\".  Daughter is at bedside and reports that this is new.  Patient is normally fairly alert and oriented and should be able to recall this information.  She did not know the month or the year, however daughter endorses that this is not new as she has been having some difficulty with remembering time recently.  However the difficulty with speech and answering the above questions is new.  Also during physical exam patient was " attempting to talk to this provider but was unable to communicate with what she was saying and was becoming frustrated with inability to express herself.    PMHX hypothyroid, glaucoma   Past Medical History  Past Medical History:   Diagnosis Date    Abdominal gas pain 07/11/2023    Bilateral impacted cerumen 07/11/2023    Diverticulosis of intestine, part unspecified, without perforation or abscess without bleeding 07/06/2021    Diverticulosis    Frequent urination 07/11/2023    Malignant (primary) neoplasm, unspecified (Multi)     Cancer    Other conditions influencing health status     Adenocarcinoma Of The Uterus    Pain, wrist joint 07/11/2023    Sinusitis 07/11/2023    Urinary tract infection 07/11/2023    Wrist sprain 07/11/2023       Surgical History  Past Surgical History:   Procedure Laterality Date    APPENDECTOMY  03/17/2015    Appendectomy    CATARACT EXTRACTION  06/29/2015    Cataract Surgery    CT ANGIO HEART CORONARY  4/20/2021    CT HEART CORONARY ANGIOGRAM 4/20/2021 [facility]    HYSTERECTOMY  10/30/2013    Hysterectomy    HYSTEROSCOPY  10/30/2013    Hysteroscopy With Endometrial Ablation    OTHER SURGICAL HISTORY  12/08/2018    Complete colonoscopy    OTHER SURGICAL HISTORY  06/30/2016    Mastoidectomy    TONSILLECTOMY  03/17/2015    Tonsillectomy With Adenoidectomy    TOTAL ABDOMINAL HYSTERECTOMY W/ BILATERAL SALPINGOOPHORECTOMY  03/17/2015    Total Abdominal Hysterectomy With Removal Of Both Ovaries        Social History  She reports that she has never smoked. She has never been exposed to tobacco smoke. She has never used smokeless tobacco. She reports that she does not drink alcohol and does not use drugs.    Family History  Family History   Problem Relation Name Age of Onset    COPD Mother          Allergies  Midazolam and Milk    Review of Systems   Neurological:  Positive for speech difficulty.   All other systems reviewed and are negative.       Physical Exam  Constitutional:        Appearance: Normal appearance.   Cardiovascular:      Rate and Rhythm: Normal rate and regular rhythm.      Pulses: Normal pulses.      Heart sounds: Normal heart sounds. No murmur heard.     No gallop.   Pulmonary:      Effort: Pulmonary effort is normal. No respiratory distress.      Breath sounds: Normal breath sounds. No wheezing or rhonchi.   Abdominal:      General: Abdomen is flat. Bowel sounds are normal. There is no distension.      Palpations: Abdomen is soft.      Tenderness: There is no abdominal tenderness. There is no guarding.   Skin:     General: Skin is warm.      Capillary Refill: Capillary refill takes less than 2 seconds.   Neurological:      Mental Status: She is alert.      Cranial Nerves: Dysarthria present. No cranial nerve deficit.      Motor: No pronator drift.      Coordination: Finger-Nose-Finger Test abnormal.      Comments: Abnl finger to nose to right side          Last Recorded Vitals  Blood pressure (!) 187/87, pulse 81, temperature 37 °C (98.6 °F), temperature source Oral, resp. rate 16, height 1.524 m (5'), weight 45.4 kg (100 lb), SpO2 97%.    Relevant Results  Scheduled medications  [START ON 9/10/2024] aspirin, 81 mg, oral, Daily  atorvastatin, 40 mg, oral, Nightly  docusate sodium, 100 mg, oral, BID  heparin (porcine), 5,000 Units, subcutaneous, q8h JEFE  [START ON 9/10/2024] pantoprazole, 40 mg, oral, Daily before breakfast  polyethylene glycol, 17 g, oral, Daily  sodium chloride, 1,000 mL, intravenous, Once  thyroid (pork), 60 mg, oral, Daily  timolol, 1 drop, Right Eye, BID      Continuous medications  lactated Ringer's, 75 mL/hr      PRN medications  PRN medications: acetaminophen **OR** acetaminophen **OR** acetaminophen, ondansetron **OR** ondansetron, polyethylene glycol    Results for orders placed or performed during the hospital encounter of 09/09/24 (from the past 24 hour(s))   CBC and Auto Differential   Result Value Ref Range    WBC 5.9 4.4 - 11.3 x10*3/uL    nRBC  0.0 0.0 - 0.0 /100 WBCs    RBC 4.21 4.00 - 5.20 x10*6/uL    Hemoglobin 12.1 12.0 - 16.0 g/dL    Hematocrit 36.4 36.0 - 46.0 %    MCV 87 80 - 100 fL    MCH 28.7 26.0 - 34.0 pg    MCHC 33.2 32.0 - 36.0 g/dL    RDW 12.5 11.5 - 14.5 %    Platelets 287 150 - 450 x10*3/uL    Neutrophils % 74.7 40.0 - 80.0 %    Immature Granulocytes %, Automated 0.2 0.0 - 0.9 %    Lymphocytes % 13.8 13.0 - 44.0 %    Monocytes % 10.5 2.0 - 10.0 %    Eosinophils % 0.3 0.0 - 6.0 %    Basophils % 0.5 0.0 - 2.0 %    Neutrophils Absolute 4.40 1.60 - 5.50 x10*3/uL    Immature Granulocytes Absolute, Automated 0.01 0.00 - 0.50 x10*3/uL    Lymphocytes Absolute 0.81 0.80 - 3.00 x10*3/uL    Monocytes Absolute 0.62 0.05 - 0.80 x10*3/uL    Eosinophils Absolute 0.02 0.00 - 0.40 x10*3/uL    Basophils Absolute 0.03 0.00 - 0.10 x10*3/uL   Comprehensive metabolic panel   Result Value Ref Range    Glucose 115 (H) 74 - 99 mg/dL    Sodium 128 (L) 136 - 145 mmol/L    Potassium 3.8 3.5 - 5.3 mmol/L    Chloride 94 (L) 98 - 107 mmol/L    Bicarbonate 22 21 - 32 mmol/L    Anion Gap 16 10 - 20 mmol/L    Urea Nitrogen 18 6 - 23 mg/dL    Creatinine 0.97 0.50 - 1.05 mg/dL    eGFR 55 (L) >60 mL/min/1.73m*2    Calcium 8.9 8.6 - 10.3 mg/dL    Albumin 3.8 3.4 - 5.0 g/dL    Alkaline Phosphatase 78 33 - 136 U/L    Total Protein 6.1 (L) 6.4 - 8.2 g/dL    AST 15 9 - 39 U/L    Bilirubin, Total 1.3 (H) 0.0 - 1.2 mg/dL    ALT 11 7 - 45 U/L   Magnesium   Result Value Ref Range    Magnesium 2.10 1.60 - 2.40 mg/dL   Influenza A, and B PCR   Result Value Ref Range    Flu A Result Not Detected Not Detected    Flu B Result Not Detected Not Detected   Sars-CoV-2 PCR   Result Value Ref Range    Coronavirus 2019, PCR Not Detected Not Detected   Urinalysis with Reflex Culture and Microscopic   Result Value Ref Range    Color, Urine Light-Yellow Light-Yellow, Yellow, Dark-Yellow    Appearance, Urine Clear Clear    Specific Gravity, Urine 1.013 1.005 - 1.035    pH, Urine 6.5 5.0, 5.5, 6.0,  6.5, 7.0, 7.5, 8.0    Protein, Urine NEGATIVE NEGATIVE, 10 (TRACE), 20 (TRACE) mg/dL    Glucose, Urine Normal Normal mg/dL    Blood, Urine 0.2 (2+) (A) NEGATIVE    Ketones, Urine NEGATIVE NEGATIVE mg/dL    Bilirubin, Urine NEGATIVE NEGATIVE    Urobilinogen, Urine Normal Normal mg/dL    Nitrite, Urine NEGATIVE NEGATIVE    Leukocyte Esterase, Urine NEGATIVE NEGATIVE   Urine electrolytes   Result Value Ref Range    Sodium, Urine Random 48 mmol/L    Sodium/Creatinine Ratio 53 Not established. mmol/g Creat    Potassium, Urine Random 45 mmol/L    Potassium/Creatinine Ratio 50 Not established mmol/g Creat    Chloride, Urine Random 38 mmol/L    Chloride/Creatinine Ratio 42 38 - 318 mmol/g creat    Creatinine, Urine Random 90.4 20.0 - 320.0 mg/dL   Urinalysis Microscopic   Result Value Ref Range    WBC, Urine NONE 1-5, NONE /HPF    RBC, Urine 3-5 NONE, 1-2, 3-5 /HPF    Hyaline Casts, Urine 2+ (A) NONE /LPF       MICRONODULAR GROWTH PATTERN         Assessment/Plan   Assessment & Plan  Altered mental status, unspecified altered mental status type    Porsha Severino is a 89 y.o. female presenting with aphasia.  Patient was recently moved to assisted living about 2 weeks ago.  She is currently accompanied by her daughter who assists with obtaining history.  Daughter endorses that this morning the staff found patient naked in her bedroom and having difficulty getting dressed.  There is also concern that this happened yesterday.  Overall this difficulty getting dressed is new and just started.  Unable to specify what specifically what is the difficulty getting dressed.  This was between 7 to 9 AM.  Her daughter then called her on the phone around 230 today and noticed the patient was having difficulty speaking such as difficulty finding words and difficulty talking.  She also reports the patient was stuttering more and was potentially slightly slurred.  Once the daughter noticed that she immediately called the facility and  "requested that they check on the patient, to which she was sent to the hospital here.  The daughter also endorses she did have concerned that the patient did have a vasovagal syncopal event this past Wednesday, to which she has had for events in the past year.    On exam patient is alert, conversive, and cooperative with exam.  She is experiencing some aphasia.  While she is able to communicate, when asked certain questions such as where she is from, how many kids she has, ETC her response is moderately delayed and she does have difficulty responding.  Such as she will say \"I do not know\".  Daughter is at bedside and reports that this is new.  Patient is normally fairly alert and oriented and should be able to recall this information.  She did not know the month or the year, however daughter endorses that this is not new as she has been having some difficulty with remembering time recently.  However the difficulty with speech and answering the above questions is new.  Also during physical exam patient was attempting to talk to this provider but was unable to communicate with what she was saying and was becoming frustrated with inability to express herself.    PMHX hypothyroid, glaucoma      Aphasia  -MRI brain  -MRA head/neck  -CT head neg  -echo with bubble study  -lipid panel  -telemetry  -neuro consulted, appreciate recs  -Q4 neuro checks   -when asked about where she was born, number of kids, etc there was delayed response and difficulty answering-> unclear if this is more confusion vs aphasia. However she later during exam appeared frustrated as she knew what she wanted to say but was unable to verbalize it   -given aphasia- higher concern for CVA/TIA rather than hyponatremia causing symptoms-> stroke work up ordered      Hyponatremia  - on admit  -unclear etiology, not on any diuretics, denies poor PO intake, no recent illness, diarrhea, or emesis   -IVF, recheck in AM     Hypothyroid  -Resume home " meds      DVT prophylaxis:  Heparin, SCD    DC plan:  DC home when medically stable    Labs/Testing reviewed    Interdisciplinary team rounding completed with hospitalist, nurse, TCC    NP discussed plan and lab/testing results with Dr. Obi HERRERA spent 45 minutes in the professional and overall care of this patient.      Nalini Lerner, APRN-CNP

## 2024-09-09 NOTE — HOSPITAL COURSE
"Porsha Severino is a 89 y.o. female with history of hypothyroidism and glaucoma presenting with aphasia.  Patient was recently moved to assisted living about 2 weeks ago.  She is currently accompanied by her daughter who assists with obtaining history.  Daughter endorses that this morning the staff found patient naked in her bedroom and having difficulty getting dressed.  There is also concern that this happened yesterday.  Overall this difficulty getting dressed is new and just started.  Unable to specify what specifically what is the difficulty getting dressed.  This was between 7 to 9 AM.  Her daughter then called her on the phone around 230 today and noticed the patient was having difficulty speaking such as difficulty finding words and difficulty talking.  She also reports the patient was stuttering more and was potentially slightly slurred.  Once the daughter noticed that she immediately called the facility and requested that they check on the patient, to which she was sent to the hospital here.  The daughter also endorses she did have concerned that the patient did have a vasovagal syncopal event this past Wednesday, to which she has had for events in the past year.        On exam patient is alert, conversive, and cooperative with exam.  She is experiencing some aphasia.  While she is able to communicate, when asked certain questions such as where she is from, how many kids she has, ETC her response is moderately delayed and she does have difficulty responding.  Such as she will say \"I do not know\".  Daughter is at bedside and reports that this is new.  Patient is normally fairly alert and oriented and should be able to recall this information.  She did not know the month or the year, however daughter endorses that this is not new as she has been having some difficulty with remembering time recently.  However the difficulty with speech and answering the above questions is new.  Also during physical exam " patient was attempting to talk to this provider but was unable to communicate with what she was saying and was becoming frustrated with inability to express herself.    Observation Course:  MRI ###  MRA head/neck ###  CT head negative  Echo ###  Neurology consulted. ###    Admission Na 128, given IVF and monitored. Etiology unknown.         Discharge weight: ### kg    After all labs and VS were reviewed the decision was made that the patient was medically stable for discharge.  The patient was discharged in satisfactory condition.    More than 30 minutes were spent in coordinating patient discharge.

## 2024-09-09 NOTE — ED PROVIDER NOTES
Emergency Department Provider Note        History of Present Illness     Chief Complaint: AMS  History provided by: Patient and EMS  Limitations to History: Altered Mental Status  External Chart Review: See ED Course    HPI:  Porsha Severino is a 89 y.o. female with PMHx of HLD presenting to the ED for AMS.    I personally discussed history with EMS who reports they were called for patient with altered mental status.  Patient had reportedly seemed confused versus was having word finding difficulty while on the phone with a family member who called EMS.  On their arrival, glucose normal.  Patient seemed confused but moving all extremities spontaneously.    Patient reports she has felt confused for the past day.  Denies headache, visual changes, numbness, weakness, fevers, chills, rhinorrhea, nasal congestion, sore throat, cough, chest pain, shortness of breath, abdominal pain, nausea, vomiting, diarrhea, dysuria, urinary frequency/urgency.    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  Has been having some memory issues lately as well.  Daughter reports pt has seemed forgetful, especially with short term topics. Pt seemed more confused over the past few days according to assisted living staff and was reportedly found in her apartment this morning completely naked and confused.  Daughter called her to check in and noticed that her speech seemed abnormal.    Physical Exam   Triage vitals:  T 37 °C (98.6 °F)  HR 82  /81  RR 17  O2 97 % None (Room air)    Constitutional: Awake, alert, not in acute distress  HENT: Head atraumatic, mucous membranes moist  Eyes:  Conjunctivae normal  Neck:  Supple  Lungs: Clear to auscultation, breath sounds equal and symmetric, no wheezes, rales, or rhonchi  Heart: Regular rate and rhythm, no murmur, rub or gallop  Abdomen: Soft, nontender, nondistended, no rebound or  guarding  Extremities: No lower extremity edema  Neuro: Alert and oriented to person, but not place or time, PERRL, CN 2-12 intact, strength 5/5 BUE and BLE, SILT BUE and BLE, normal FNF and PERNELL  Skin: Warm, dry  Psych: Calm, cooperative      Medical Decision Making & ED Course   Medical Decision Making:  Porsha Severino is a 89 y.o. female with PMHx as above in HPI who presented to the ED for AMS. Patient was afebrile, hemodynamically stable, and satting on room air on arrival.     Exam as above.    D stick normal on arrival.     EKG showed NSR without signs of acute ischemia.    CT head without evidence of ICH or masses.   CXR without evidence of PTX, PNA, widened mediastinum, or pulmonary edema.    Labs below in ED course, notable for CBC: no anemia, BMP: no MITCHELL , hyponatremia worsened compared to prior, LFT: no evidence of obstructive biliary pathology, no evidence of acute liver injury, UA: not suggestive of UTI, covid/flu negative.    She was given a bolus of normal saline. Her symptoms could be due to worsened hyponatremia but would benefit from admission and further workup. Admitted to medicine.   ------------------------------------------------  Independent Test Interpretation: See ED Course  Discussion with Other Providers: See ED Course    ED Course:  ED Course as of 09/09/24 2136   Mon Sep 09, 2024   4027 External chart review:  OS ED visit 4/7/24  Seen after fall     [SS]   1552 CT head wo IV contrast  I have personally reviewed and interpreted this CT head, which shows no large ICH. Final decision making pending radiology read.   [SS]   1622 CT head wo IV contrast  Radiology read:  IMPRESSION:  No acute intracranial abnormality. Consider follow-up with MRI as  warranted.   [SS]   1645 CBC and Auto Differential  No leukocytosis, anemia, or thrombocytopenia   [SS]   1645 XR chest 2 views  I have personally reviewed and interpreted this CXR, which shows no PNA or PTX. Final decision making pending  radiology read.   [SS]   1649 XR chest 2 views  Radiology read:  IMPRESSION:  Mild interstitial prominence.       [SS]   1700 ECG 12 lead  I have personally reviewed and interpreted this EKG.  Normal sinus rhythm, rate 79 BPM  Normal axis  NM interval prolonged with 1st degree AV block  QRS duration within normal limits.  QTc within normal limits.  No signs of acute ischemia or infarction   [SS]   1706 Comprehensive metabolic panel(!)  Hyponatremia, no MITCHELL, mild elevation T. bili but no signs of acute liver injury [SS]   1706 MAGNESIUM: 2.10  normal [SS]   1724 Personally discussed currently available facts and concerns about the case with medicine, who advises accepts to obs   [SS]   1725 Influenza A, and B PCR  neg [SS]   1725 Coronavirus 2019, PCR: Not Detected  neg [SS]   1738 Urinalysis with Reflex Culture and Microscopic(!)  No signs of UTI   [SS]      ED Course User Index  [SS] Steffen Simerlink, MD         Diagnoses as of 09/09/24 2136   Altered mental status, unspecified altered mental status type   Hyponatremia     Disposition   As a result of their workup, the patient will require admission to the hospital.  The patient was informed of her diagnosis.  The patient was given the opportunity to ask questions and I answered them. The patient agreed to be admitted to the hospital.    Procedures   Procedures    Steffen Simerlink, MD Steffen Simerlink, MD  09/09/24 2136

## 2024-09-10 ENCOUNTER — APPOINTMENT (OUTPATIENT)
Dept: CARDIOLOGY | Facility: HOSPITAL | Age: 89
End: 2024-09-10
Payer: MEDICARE

## 2024-09-10 VITALS
DIASTOLIC BLOOD PRESSURE: 80 MMHG | BODY MASS INDEX: 19.63 KG/M2 | RESPIRATION RATE: 17 BRPM | HEART RATE: 81 BPM | TEMPERATURE: 97.3 F | HEIGHT: 60 IN | OXYGEN SATURATION: 96 % | SYSTOLIC BLOOD PRESSURE: 154 MMHG | WEIGHT: 100 LBS

## 2024-09-10 LAB
ANION GAP SERPL CALC-SCNC: 13 MMOL/L (ref 10–20)
AORTIC VALVE MEAN GRADIENT: 2 MMHG
AORTIC VALVE PEAK VELOCITY: 0.98 M/S
AV PEAK GRADIENT: 3.8 MMHG
AVA (PEAK VEL): 1.83 CM2
AVA (VTI): 2 CM2
BASOPHILS # BLD AUTO: 0.03 X10*3/UL (ref 0–0.1)
BASOPHILS NFR BLD AUTO: 0.6 %
BUN SERPL-MCNC: 17 MG/DL (ref 6–23)
CALCIUM SERPL-MCNC: 8.5 MG/DL (ref 8.6–10.3)
CHLORIDE SERPL-SCNC: 98 MMOL/L (ref 98–107)
CO2 SERPL-SCNC: 25 MMOL/L (ref 21–32)
CREAT SERPL-MCNC: 0.8 MG/DL (ref 0.5–1.05)
EGFRCR SERPLBLD CKD-EPI 2021: 70 ML/MIN/1.73M*2
EJECTION FRACTION APICAL 4 CHAMBER: 63.9
EJECTION FRACTION: 61 %
EOSINOPHIL # BLD AUTO: 0.1 X10*3/UL (ref 0–0.4)
EOSINOPHIL NFR BLD AUTO: 2.1 %
ERYTHROCYTE [DISTWIDTH] IN BLOOD BY AUTOMATED COUNT: 12.5 % (ref 11.5–14.5)
GLUCOSE SERPL-MCNC: 99 MG/DL (ref 74–99)
HCT VFR BLD AUTO: 35.2 % (ref 36–46)
HGB BLD-MCNC: 12 G/DL (ref 12–16)
HOLD SPECIMEN: NORMAL
IMM GRANULOCYTES # BLD AUTO: 0.01 X10*3/UL (ref 0–0.5)
IMM GRANULOCYTES NFR BLD AUTO: 0.2 % (ref 0–0.9)
LEFT ATRIUM VOLUME AREA LENGTH INDEX BSA: 29 ML/M2
LEFT VENTRICLE INTERNAL DIMENSION DIASTOLE: 3.36 CM (ref 3.5–6)
LEFT VENTRICULAR OUTFLOW TRACT DIAMETER: 1.76 CM
LYMPHOCYTES # BLD AUTO: 1.02 X10*3/UL (ref 0.8–3)
LYMPHOCYTES NFR BLD AUTO: 21.4 %
MCH RBC QN AUTO: 29.3 PG (ref 26–34)
MCHC RBC AUTO-ENTMCNC: 34.1 G/DL (ref 32–36)
MCV RBC AUTO: 86 FL (ref 80–100)
MITRAL VALVE E/A RATIO: 0.55
MONOCYTES # BLD AUTO: 0.55 X10*3/UL (ref 0.05–0.8)
MONOCYTES NFR BLD AUTO: 11.5 %
NEUTROPHILS # BLD AUTO: 3.06 X10*3/UL (ref 1.6–5.5)
NEUTROPHILS NFR BLD AUTO: 64.2 %
NRBC BLD-RTO: 0 /100 WBCS (ref 0–0)
PLATELET # BLD AUTO: 268 X10*3/UL (ref 150–450)
POTASSIUM SERPL-SCNC: 3.5 MMOL/L (ref 3.5–5.3)
RBC # BLD AUTO: 4.1 X10*6/UL (ref 4–5.2)
RIGHT VENTRICLE FREE WALL PEAK S': 12 CM/S
RIGHT VENTRICLE PEAK SYSTOLIC PRESSURE: 18.5 MMHG
SODIUM SERPL-SCNC: 132 MMOL/L (ref 136–145)
T4 FREE SERPL-MCNC: 0.92 NG/DL (ref 0.61–1.12)
TRICUSPID ANNULAR PLANE SYSTOLIC EXCURSION: 1.7 CM
TSH SERPL-ACNC: 0.93 MIU/L (ref 0.44–3.98)
WBC # BLD AUTO: 4.8 X10*3/UL (ref 4.4–11.3)

## 2024-09-10 PROCEDURE — 85025 COMPLETE CBC W/AUTO DIFF WBC: CPT | Performed by: NURSE PRACTITIONER

## 2024-09-10 PROCEDURE — 80048 BASIC METABOLIC PNL TOTAL CA: CPT | Performed by: NURSE PRACTITIONER

## 2024-09-10 PROCEDURE — G0378 HOSPITAL OBSERVATION PER HR: HCPCS

## 2024-09-10 PROCEDURE — 36415 COLL VENOUS BLD VENIPUNCTURE: CPT | Performed by: NURSE PRACTITIONER

## 2024-09-10 PROCEDURE — 84443 ASSAY THYROID STIM HORMONE: CPT | Performed by: INTERNAL MEDICINE

## 2024-09-10 PROCEDURE — 93306 TTE W/DOPPLER COMPLETE: CPT | Performed by: INTERNAL MEDICINE

## 2024-09-10 PROCEDURE — 2500000001 HC RX 250 WO HCPCS SELF ADMINISTERED DRUGS (ALT 637 FOR MEDICARE OP): Performed by: NURSE PRACTITIONER

## 2024-09-10 PROCEDURE — 93306 TTE W/DOPPLER COMPLETE: CPT

## 2024-09-10 PROCEDURE — 2500000004 HC RX 250 GENERAL PHARMACY W/ HCPCS (ALT 636 FOR OP/ED): Performed by: NURSE PRACTITIONER

## 2024-09-10 PROCEDURE — 84439 ASSAY OF FREE THYROXINE: CPT | Performed by: INTERNAL MEDICINE

## 2024-09-10 PROCEDURE — 99223 1ST HOSP IP/OBS HIGH 75: CPT | Performed by: PSYCHIATRY & NEUROLOGY

## 2024-09-10 RX ORDER — ATORVASTATIN CALCIUM 40 MG/1
40 TABLET, FILM COATED ORAL NIGHTLY
Qty: 30 TABLET | Refills: 0 | Status: SHIPPED | OUTPATIENT
Start: 2024-09-10

## 2024-09-10 RX ORDER — ASPIRIN 81 MG/1
81 TABLET ORAL DAILY
Qty: 30 TABLET | Refills: 0 | Status: SHIPPED | OUTPATIENT
Start: 2024-09-11

## 2024-09-10 SDOH — SOCIAL STABILITY: SOCIAL INSECURITY: HAVE YOU HAD THOUGHTS OF HARMING ANYONE ELSE?: NO

## 2024-09-10 SDOH — SOCIAL STABILITY: SOCIAL INSECURITY: DO YOU FEEL UNSAFE GOING BACK TO THE PLACE WHERE YOU ARE LIVING?: NO

## 2024-09-10 SDOH — SOCIAL STABILITY: SOCIAL INSECURITY: DOES ANYONE TRY TO KEEP YOU FROM HAVING/CONTACTING OTHER FRIENDS OR DOING THINGS OUTSIDE YOUR HOME?: NO

## 2024-09-10 SDOH — SOCIAL STABILITY: SOCIAL INSECURITY: ABUSE: ADULT

## 2024-09-10 SDOH — SOCIAL STABILITY: SOCIAL INSECURITY: ARE THERE ANY APPARENT SIGNS OF INJURIES/BEHAVIORS THAT COULD BE RELATED TO ABUSE/NEGLECT?: NO

## 2024-09-10 SDOH — SOCIAL STABILITY: SOCIAL INSECURITY: ARE YOU OR HAVE YOU BEEN THREATENED OR ABUSED PHYSICALLY, EMOTIONALLY, OR SEXUALLY BY ANYONE?: NO

## 2024-09-10 SDOH — SOCIAL STABILITY: SOCIAL INSECURITY: DO YOU FEEL ANYONE HAS EXPLOITED OR TAKEN ADVANTAGE OF YOU FINANCIALLY OR OF YOUR PERSONAL PROPERTY?: NO

## 2024-09-10 SDOH — SOCIAL STABILITY: SOCIAL INSECURITY: HAS ANYONE EVER THREATENED TO HURT YOUR FAMILY OR YOUR PETS?: NO

## 2024-09-10 SDOH — SOCIAL STABILITY: SOCIAL INSECURITY: WERE YOU ABLE TO COMPLETE ALL THE BEHAVIORAL HEALTH SCREENINGS?: YES

## 2024-09-10 ASSESSMENT — ACTIVITIES OF DAILY LIVING (ADL)
GROOMING: NEEDS ASSISTANCE
TOILETING: NEEDS ASSISTANCE
WALKS IN HOME: NEEDS ASSISTANCE
DRESSING YOURSELF: NEEDS ASSISTANCE
LACK_OF_TRANSPORTATION: PATIENT UNABLE TO ANSWER
FEEDING YOURSELF: INDEPENDENT
HEARING - RIGHT EAR: HEARING AID
ADEQUATE_TO_COMPLETE_ADL: NO
JUDGMENT_ADEQUATE_SAFELY_COMPLETE_DAILY_ACTIVITIES: NO
BATHING: NEEDS ASSISTANCE
PATIENT'S MEMORY ADEQUATE TO SAFELY COMPLETE DAILY ACTIVITIES?: NO
HEARING - LEFT EAR: HEARING AID

## 2024-09-10 ASSESSMENT — COGNITIVE AND FUNCTIONAL STATUS - GENERAL
MOBILITY SCORE: 18
PERSONAL GROOMING: A LITTLE
DRESSING REGULAR LOWER BODY CLOTHING: A LITTLE
TOILETING: A LITTLE
CLIMB 3 TO 5 STEPS WITH RAILING: A LITTLE
HELP NEEDED FOR BATHING: A LITTLE
STANDING UP FROM CHAIR USING ARMS: A LITTLE
DRESSING REGULAR UPPER BODY CLOTHING: A LITTLE
DAILY ACTIVITIY SCORE: 19
TURNING FROM BACK TO SIDE WHILE IN FLAT BAD: A LITTLE
WALKING IN HOSPITAL ROOM: A LITTLE
PATIENT BASELINE BEDBOUND: NO
MOVING FROM LYING ON BACK TO SITTING ON SIDE OF FLAT BED WITH BEDRAILS: A LITTLE
MOVING TO AND FROM BED TO CHAIR: A LITTLE

## 2024-09-10 ASSESSMENT — PATIENT HEALTH QUESTIONNAIRE - PHQ9
2. FEELING DOWN, DEPRESSED OR HOPELESS: NOT AT ALL
SUM OF ALL RESPONSES TO PHQ9 QUESTIONS 1 & 2: 0
1. LITTLE INTEREST OR PLEASURE IN DOING THINGS: NOT AT ALL

## 2024-09-10 ASSESSMENT — LIFESTYLE VARIABLES
AUDIT-C TOTAL SCORE: 0
AUDIT-C TOTAL SCORE: 0
HOW MANY STANDARD DRINKS CONTAINING ALCOHOL DO YOU HAVE ON A TYPICAL DAY: PATIENT DOES NOT DRINK
SKIP TO QUESTIONS 9-10: 1
HOW OFTEN DO YOU HAVE 6 OR MORE DRINKS ON ONE OCCASION: NEVER
HOW OFTEN DO YOU HAVE A DRINK CONTAINING ALCOHOL: NEVER

## 2024-09-10 ASSESSMENT — PAIN SCALES - GENERAL
PAINLEVEL_OUTOF10: 0 - NO PAIN

## 2024-09-10 ASSESSMENT — PAIN - FUNCTIONAL ASSESSMENT: PAIN_FUNCTIONAL_ASSESSMENT: 0-10

## 2024-09-10 NOTE — PROGRESS NOTES
09/10/24 1436   Discharge Planning   Home or Post Acute Services Post acute facilities (Rehab/SNF/etc)   Expected Discharge Disposition GARRY     Attempted to call patient's nurse at her AL but they must have been having trouble with their phone system as it kept hanging up when they tried to transfer me to the nurse.  Will attempt to call a bit later.    1515 Spoke to patient's nurse Lissette [phone])-ok for patient to return.  Lissette has been getting updates from the patient's daughter so she feels like she has a good understanding of the tests that have been run and the results.  Did explain that patient has had a sitter but it has been more for safety today than aggression  Report number give via secure chat to Brian.

## 2024-09-10 NOTE — PROGRESS NOTES
Transitional Care Coordination Progress Note:  Plan per Medical/Surgical team: treatment of >MS, aphasia with IV fluids, neuro consult, ECHO pending  Status: Observation  Payor source: [employer]  Discharge disposition: [facility] [phone]  Potential Barriers: blind, dementia  ADOD: 9/11/2024   TERE Nuñez RN, BSN Transitional Care Coordinator ED# [phone]      09/10/24 0733   Discharge Planning   Living Arrangements Alone   Support Systems Children   Assistance Needed neuro work up   Type of Residence Assisted living   Do you have animals or pets at home? No   Care Facility Name [facility]  [address],   [place] [address]  [phone]   Home or Post Acute Services Post acute facilities (Rehab/SNF/etc)   Type of Post Acute Facility Services Assisted living   Expected Discharge Disposition GARRY   Does the patient need discharge transport arranged? Yes   RoundTrip coordination needed? Yes   Has discharge transport been arranged? No   Financial Resource Strain   How hard is it for you to pay for the very basics like food, housing, medical care, and heating? Pt Unable   Housing Stability   In the last 12 months, was there a time when you were not able to pay the mortgage or rent on time? Pt Unable   In the past 12 months, how many times have you moved where you were living? 2   At any time in the past 12 months, were you homeless or living in a shelter (including now)? Pt Unable   Transportation Needs   In the past 12 months, has lack of transportation kept you from medical appointments or from getting medications? Pt Unable   In the past 12 months, has lack of transportation kept you from meetings, work, or from getting things needed for daily living? Pt Unable

## 2024-09-10 NOTE — DISCHARGE INSTRUCTIONS
[facility] Observation Unit Discharge Instructions  You came to the hospital with speech difficulty and confusion and were admitted for observation and further care.   You were treated with CT head and MRI which were negative for stroke.   A neurology  specialist saw you while admitted and helped manage your care. There is concern symptoms are consistent with dementia,likely alzheimer's.    Your discharge plan is to go home to recover.    Please see your primary care doctor in 1 week for follow-up.   An appointment has been requested for you but may you need to call your doctors office to schedule.       For any issues or concerns with appointments, call the  scheduling line at [phone] or the providers office directly.       See the attached information for education about any new medications and the condition(s) you were treated for.  Your medications may have changed so pay close attention to the list given to you at discharge and ask any questions you have before leaving the hospital.

## 2024-09-10 NOTE — PROGRESS NOTES
[facility]  [address],   [place] [address]  [phone]     09/10/24 0733   Current Planned Discharge Disposition   Current Planned Discharge Disposition Home

## 2024-09-10 NOTE — PROGRESS NOTES
09/10/24 7388   Discharge Planning   Assistance Needed potentially needs higher level of care   Expected Discharge Disposition GARRY BRONSON called patient's daughter. Unable to reach daughter, unsure if this is correct contact number.

## 2024-09-10 NOTE — PROGRESS NOTES
09/10/24 0733   [facility] Disability Status   Are you deaf or do you have serious difficulty hearing? N   Are you blind or do you have serious difficulty seeing, even when wearing glasses? Y  (blind)   Because of a physical, mental, or emotional condition, do you have serious difficulty concentrating, remembering, or making decisions? (5 years old or older) Y  (dementia)   Do you have serious difficulty walking or climbing stairs? Y   Do you have serious difficulty dressing or bathing? Y   Because of a physical, mental, or emotional condition, do you have serious difficulty doing errands alone such as visiting the doctor? Y

## 2024-09-10 NOTE — NURSING NOTE
Discharge paperwork reviewed with pt family members, no questions verbalized. Pt family members educated about signs and symptoms of stroke and when to seek medical attention. IV removed without complications. Telemetry monitor removed. Pt discharged to [facility] via private vehicle.

## 2024-09-10 NOTE — PROGRESS NOTES
Porsha Severino is a 89 y.o. female on day 0 of admission presenting with Altered mental status, unspecified altered mental status type.    Subjective   Resting in bed. Confused. Reportedly became confused last night and required sitter which is now at bedside. On exam she still is having some difficulty finding words but is conversant. She is aware she is here for difficulty talking however does not recall this provider yesterday seeing her. Also does not recall her daughter being with her for some time yesterday.  Asking where her daughter is and overall appears confused        Objective     Physical Exam  Constitutional:       Appearance: Normal appearance.   Cardiovascular:      Rate and Rhythm: Normal rate and regular rhythm.      Pulses: Normal pulses.      Heart sounds: Normal heart sounds. No murmur heard.     No gallop.   Pulmonary:      Effort: Pulmonary effort is normal. No respiratory distress.      Breath sounds: Normal breath sounds. No wheezing or rhonchi.   Abdominal:      General: Abdomen is flat. Bowel sounds are normal. There is no distension.      Palpations: Abdomen is soft.      Tenderness: There is no abdominal tenderness. There is no guarding.   Musculoskeletal:         General: Normal range of motion.   Skin:     General: Skin is warm.      Capillary Refill: Capillary refill takes less than 2 seconds.   Neurological:      Mental Status: She is alert. She is confused.      Cranial Nerves: Dysarthria present.         Last Recorded Vitals  Blood pressure 146/73, pulse 74, temperature 36.3 °C (97.3 °F), temperature source Temporal, resp. rate 18, height 1.524 m (5'), weight 45.4 kg (100 lb), SpO2 96%.  Intake/Output last 3 Shifts:  No intake/output data recorded.    Relevant Results  Scheduled medications  aspirin, 81 mg, oral, Daily  atorvastatin, 40 mg, oral, Nightly  docusate sodium, 100 mg, oral, BID  heparin (porcine), 5,000 Units, subcutaneous, q8h JEFE  pantoprazole, 40 mg, oral, Daily  before breakfast  polyethylene glycol, 17 g, oral, Daily  thyroid (pork), 60 mg, oral, q AM  timolol, 1 drop, Right Eye, q AM      Continuous medications  lactated Ringer's, 75 mL/hr, Last Rate: 75 mL/hr (09/09/24 2140)      PRN medications  PRN medications: acetaminophen **OR** acetaminophen **OR** acetaminophen, ondansetron **OR** ondansetron, polyethylene glycol  Results for orders placed or performed during the hospital encounter of 09/09/24 (from the past 24 hour(s))   CBC and Auto Differential   Result Value Ref Range    WBC 5.9 4.4 - 11.3 x10*3/uL    nRBC 0.0 0.0 - 0.0 /100 WBCs    RBC 4.21 4.00 - 5.20 x10*6/uL    Hemoglobin 12.1 12.0 - 16.0 g/dL    Hematocrit 36.4 36.0 - 46.0 %    MCV 87 80 - 100 fL    MCH 28.7 26.0 - 34.0 pg    MCHC 33.2 32.0 - 36.0 g/dL    RDW 12.5 11.5 - 14.5 %    Platelets 287 150 - 450 x10*3/uL    Neutrophils % 74.7 40.0 - 80.0 %    Immature Granulocytes %, Automated 0.2 0.0 - 0.9 %    Lymphocytes % 13.8 13.0 - 44.0 %    Monocytes % 10.5 2.0 - 10.0 %    Eosinophils % 0.3 0.0 - 6.0 %    Basophils % 0.5 0.0 - 2.0 %    Neutrophils Absolute 4.40 1.60 - 5.50 x10*3/uL    Immature Granulocytes Absolute, Automated 0.01 0.00 - 0.50 x10*3/uL    Lymphocytes Absolute 0.81 0.80 - 3.00 x10*3/uL    Monocytes Absolute 0.62 0.05 - 0.80 x10*3/uL    Eosinophils Absolute 0.02 0.00 - 0.40 x10*3/uL    Basophils Absolute 0.03 0.00 - 0.10 x10*3/uL   Comprehensive metabolic panel   Result Value Ref Range    Glucose 115 (H) 74 - 99 mg/dL    Sodium 128 (L) 136 - 145 mmol/L    Potassium 3.8 3.5 - 5.3 mmol/L    Chloride 94 (L) 98 - 107 mmol/L    Bicarbonate 22 21 - 32 mmol/L    Anion Gap 16 10 - 20 mmol/L    Urea Nitrogen 18 6 - 23 mg/dL    Creatinine 0.97 0.50 - 1.05 mg/dL    eGFR 55 (L) >60 mL/min/1.73m*2    Calcium 8.9 8.6 - 10.3 mg/dL    Albumin 3.8 3.4 - 5.0 g/dL    Alkaline Phosphatase 78 33 - 136 U/L    Total Protein 6.1 (L) 6.4 - 8.2 g/dL    AST 15 9 - 39 U/L    Bilirubin, Total 1.3 (H) 0.0 - 1.2 mg/dL     ALT 11 7 - 45 U/L   Magnesium   Result Value Ref Range    Magnesium 2.10 1.60 - 2.40 mg/dL   Influenza A, and B PCR   Result Value Ref Range    Flu A Result Not Detected Not Detected    Flu B Result Not Detected Not Detected   Sars-CoV-2 PCR   Result Value Ref Range    Coronavirus 2019, PCR Not Detected Not Detected   Osmolality   Result Value Ref Range    Osmolality, Serum 275 (L) 280 - 300 mOsm/kg   ECG 12 lead   Result Value Ref Range    Ventricular Rate 79 BPM    Atrial Rate 79 BPM    LA Interval 220 ms    QRS Duration 74 ms    QT Interval 384 ms    QTC Calculation(Bazett) 440 ms    P Axis 62 degrees    R Axis 65 degrees    T Axis 68 degrees    QRS Count 13 beats    Q Onset 224 ms    P Onset 114 ms    P Offset 164 ms    T Offset 416 ms    QTC Fredericia 421 ms   Urinalysis with Reflex Culture and Microscopic   Result Value Ref Range    Color, Urine Light-Yellow Light-Yellow, Yellow, Dark-Yellow    Appearance, Urine Clear Clear    Specific Gravity, Urine 1.013 1.005 - 1.035    pH, Urine 6.5 5.0, 5.5, 6.0, 6.5, 7.0, 7.5, 8.0    Protein, Urine NEGATIVE NEGATIVE, 10 (TRACE), 20 (TRACE) mg/dL    Glucose, Urine Normal Normal mg/dL    Blood, Urine 0.2 (2+) (A) NEGATIVE    Ketones, Urine NEGATIVE NEGATIVE mg/dL    Bilirubin, Urine NEGATIVE NEGATIVE    Urobilinogen, Urine Normal Normal mg/dL    Nitrite, Urine NEGATIVE NEGATIVE    Leukocyte Esterase, Urine NEGATIVE NEGATIVE   Extra Urine Gray Tube   Result Value Ref Range    Extra Tube Hold for add-ons.    Urine electrolytes   Result Value Ref Range    Sodium, Urine Random 48 mmol/L    Sodium/Creatinine Ratio 53 Not established. mmol/g Creat    Potassium, Urine Random 45 mmol/L    Potassium/Creatinine Ratio 50 Not established mmol/g Creat    Chloride, Urine Random 38 mmol/L    Chloride/Creatinine Ratio 42 38 - 318 mmol/g creat    Creatinine, Urine Random 90.4 20.0 - 320.0 mg/dL   Urinalysis Microscopic   Result Value Ref Range    WBC, Urine NONE 1-5, NONE /HPF    RBC,  Urine 3-5 NONE, 1-2, 3-5 /HPF    Hyaline Casts, Urine 2+ (A) NONE /LPF   Basic metabolic panel   Result Value Ref Range    Glucose 99 74 - 99 mg/dL    Sodium 132 (L) 136 - 145 mmol/L    Potassium 3.5 3.5 - 5.3 mmol/L    Chloride 98 98 - 107 mmol/L    Bicarbonate 25 21 - 32 mmol/L    Anion Gap 13 10 - 20 mmol/L    Urea Nitrogen 17 6 - 23 mg/dL    Creatinine 0.80 0.50 - 1.05 mg/dL    eGFR 70 >60 mL/min/1.73m*2    Calcium 8.5 (L) 8.6 - 10.3 mg/dL   CBC and Auto Differential   Result Value Ref Range    WBC 4.8 4.4 - 11.3 x10*3/uL    nRBC 0.0 0.0 - 0.0 /100 WBCs    RBC 4.10 4.00 - 5.20 x10*6/uL    Hemoglobin 12.0 12.0 - 16.0 g/dL    Hematocrit 35.2 (L) 36.0 - 46.0 %    MCV 86 80 - 100 fL    MCH 29.3 26.0 - 34.0 pg    MCHC 34.1 32.0 - 36.0 g/dL    RDW 12.5 11.5 - 14.5 %    Platelets 268 150 - 450 x10*3/uL    Neutrophils % 64.2 40.0 - 80.0 %    Immature Granulocytes %, Automated 0.2 0.0 - 0.9 %    Lymphocytes % 21.4 13.0 - 44.0 %    Monocytes % 11.5 2.0 - 10.0 %    Eosinophils % 2.1 0.0 - 6.0 %    Basophils % 0.6 0.0 - 2.0 %    Neutrophils Absolute 3.06 1.60 - 5.50 x10*3/uL    Immature Granulocytes Absolute, Automated 0.01 0.00 - 0.50 x10*3/uL    Lymphocytes Absolute 1.02 0.80 - 3.00 x10*3/uL    Monocytes Absolute 0.55 0.05 - 0.80 x10*3/uL    Eosinophils Absolute 0.10 0.00 - 0.40 x10*3/uL    Basophils Absolute 0.03 0.00 - 0.10 x10*3/uL       ECG 12 lead    Result Date: 9/10/2024  Sinus rhythm with 1st degree AV block Otherwise normal ECG When compared with ECG of 20-APR-2021 08:50, No significant change was found    MR brain wo IV contrast    Result Date: 9/9/2024  Interpreted By:  Vladimir Mason, STUDY: MR BRAIN WO IV CONTRAST; MR ANGIO HEAD WO IV CONTRAST; MR ANGIO NECK WO IV CONTRAST;  9/9/2024 8:55 pm   INDICATION: Signs/Symptoms:aphasia.   COMPARISON: Head CT 09/09/2024.   ACCESSION NUMBER(S): [id number]; [id number]; [id number]   ORDERING CLINICIAN: ELIZABETH WATKINS   TECHNIQUE: Axial T2, FLAIR, DWI, gradient  echo T2 and sagittal and coronal T1 weighted images of brain were acquired. Noncontrast time-of-flight MR angiogram of the shakku-nt-Mutzge vessels was obtained with MIP reformats. Noncontrast time of flight MR angiogram of the neck vessels was obtained with MIP reformats.   FINDINGS: Brain MRI: There is no evidence of acute infarction. There are no extra-axial collections. There is no mass lesion and no midline shift. There is no hydrocephalus. There is generalized cerebral volume loss and associated ventricular and sulcal enlargement. There are confluence periventricular and deep white matter FLAIR hyperintensities suggestive of moderate chronic microvascular ischemic change. There are also chronic microhemorrhages visualized in the bilateral cerebellar hemispheres suggestive of sequela of small-vessel ischemic disease.   Paranasal Sinuses and Mastoids: Visualized paranasal sinuses are well aerated. The mastoid air cells are clear. The orbits are grossly normal.   MRA of the brain: Anterior circulation: The intracranial portions of the internal carotid, middle cerebral, and anterior cerebral arteries are patent, without significant focal stenosis. There is a normal anterior communicating artery.   Posterior circulation: The intracranial portions of the vertebral arteries are patent. The basilar artery is patent without focal stenosis. The visualized portions of proximal posterior cerebral arteries are patent without focal stenosis. There is a fetal variant left posterior cerebral artery, normal variant.     MRA of the neck: Common carotid arteries: Patent. Left internal carotid: No significant stenosis. Right internal carotid: No significant stenosis. Cervical vertebral arteries: Patent cervical vertebral arteries without focal stenosis.       No evidence of acute infarct, intracranial mass effect or midline shift. Moderate generalized cerebral volume loss and moderate chronic microvascular ischemic change. No  evidence of large vessel occlusion or critical stenosis.   Signed by: Vladimir Mason 9/9/2024 9:27 PM Dictation workstation:   [id number]    MR angio head wo IV contrast    Result Date: 9/9/2024  Interpreted By:  Vladimir Mason, STUDY: MR BRAIN WO IV CONTRAST; MR ANGIO HEAD WO IV CONTRAST; MR ANGIO NECK WO IV CONTRAST;  9/9/2024 8:55 pm   INDICATION: Signs/Symptoms:aphasia.   COMPARISON: Head CT 09/09/2024.   ACCESSION NUMBER(S): [id number]; [id number]; [id number]   ORDERING CLINICIAN: ELIZABETH WATKINS   TECHNIQUE: Axial T2, FLAIR, DWI, gradient echo T2 and sagittal and coronal T1 weighted images of brain were acquired. Noncontrast time-of-flight MR angiogram of the znyuos-kv-Mcdnlx vessels was obtained with MIP reformats. Noncontrast time of flight MR angiogram of the neck vessels was obtained with MIP reformats.   FINDINGS: Brain MRI: There is no evidence of acute infarction. There are no extra-axial collections. There is no mass lesion and no midline shift. There is no hydrocephalus. There is generalized cerebral volume loss and associated ventricular and sulcal enlargement. There are confluence periventricular and deep white matter FLAIR hyperintensities suggestive of moderate chronic microvascular ischemic change. There are also chronic microhemorrhages visualized in the bilateral cerebellar hemispheres suggestive of sequela of small-vessel ischemic disease.   Paranasal Sinuses and Mastoids: Visualized paranasal sinuses are well aerated. The mastoid air cells are clear. The orbits are grossly normal.   MRA of the brain: Anterior circulation: The intracranial portions of the internal carotid, middle cerebral, and anterior cerebral arteries are patent, without significant focal stenosis. There is a normal anterior communicating artery.   Posterior circulation: The intracranial portions of the vertebral arteries are patent. The basilar artery is patent without focal stenosis. The visualized portions of  proximal posterior cerebral arteries are patent without focal stenosis. There is a fetal variant left posterior cerebral artery, normal variant.     MRA of the neck: Common carotid arteries: Patent. Left internal carotid: No significant stenosis. Right internal carotid: No significant stenosis. Cervical vertebral arteries: Patent cervical vertebral arteries without focal stenosis.       No evidence of acute infarct, intracranial mass effect or midline shift. Moderate generalized cerebral volume loss and moderate chronic microvascular ischemic change. No evidence of large vessel occlusion or critical stenosis.   Signed by: Vladimir Mason 9/9/2024 9:27 PM Dictation workstation:   [id number]    MR angio neck wo IV contrast    Result Date: 9/9/2024  Interpreted By:  Vladimir Mason, STUDY: MR BRAIN WO IV CONTRAST; MR ANGIO HEAD WO IV CONTRAST; MR ANGIO NECK WO IV CONTRAST;  9/9/2024 8:55 pm   INDICATION: Signs/Symptoms:aphasia.   COMPARISON: Head CT 09/09/2024.   ACCESSION NUMBER(S): [id number]; [id number]; [id number]   ORDERING CLINICIAN: ELIZABETH WATKINS   TECHNIQUE: Axial T2, FLAIR, DWI, gradient echo T2 and sagittal and coronal T1 weighted images of brain were acquired. Noncontrast time-of-flight MR angiogram of the xdnvkf-wd-Igvluf vessels was obtained with MIP reformats. Noncontrast time of flight MR angiogram of the neck vessels was obtained with MIP reformats.   FINDINGS: Brain MRI: There is no evidence of acute infarction. There are no extra-axial collections. There is no mass lesion and no midline shift. There is no hydrocephalus. There is generalized cerebral volume loss and associated ventricular and sulcal enlargement. There are confluence periventricular and deep white matter FLAIR hyperintensities suggestive of moderate chronic microvascular ischemic change. There are also chronic microhemorrhages visualized in the bilateral cerebellar hemispheres suggestive of sequela of small-vessel ischemic  disease.   Paranasal Sinuses and Mastoids: Visualized paranasal sinuses are well aerated. The mastoid air cells are clear. The orbits are grossly normal.   MRA of the brain: Anterior circulation: The intracranial portions of the internal carotid, middle cerebral, and anterior cerebral arteries are patent, without significant focal stenosis. There is a normal anterior communicating artery.   Posterior circulation: The intracranial portions of the vertebral arteries are patent. The basilar artery is patent without focal stenosis. The visualized portions of proximal posterior cerebral arteries are patent without focal stenosis. There is a fetal variant left posterior cerebral artery, normal variant.     MRA of the neck: Common carotid arteries: Patent. Left internal carotid: No significant stenosis. Right internal carotid: No significant stenosis. Cervical vertebral arteries: Patent cervical vertebral arteries without focal stenosis.       No evidence of acute infarct, intracranial mass effect or midline shift. Moderate generalized cerebral volume loss and moderate chronic microvascular ischemic change. No evidence of large vessel occlusion or critical stenosis.   Signed by: Vladimir Mason 9/9/2024 9:27 PM Dictation workstation:   [id number]    XR chest 2 views    Result Date: 9/9/2024  Interpreted By:  Jill Mendoza, STUDY: XR CHEST 2 VIEWS;  9/9/2024 4:40 pm   INDICATION: Signs/Symptoms:AMS.     COMPARISON: None.   ACCESSION NUMBER(S): [id number]   ORDERING CLINICIAN: STEFFEN SIMERLINK   FINDINGS: Mild coarsening of the interstitial markings. No focal infiltrate, pleural effusion or pneumothorax. Slightly nodular biapical pleural thickening likely related to remote inflammatory disease. The cardiac silhouette is within normal limits for size.       Mild interstitial prominence.   MACRO: None.   Signed by: Jill Mendoza 9/9/2024 4:47 PM Dictation workstation:   [id number]    CT head wo IV contrast    Result Date:  9/9/2024  Interpreted By:  Dottie Archuleta, STUDY: CT HEAD WO IV CONTRAST;  9/9/2024 3:55 pm   INDICATION: Signs/Symptoms:AMS.   COMPARISON: None.   ACCESSION NUMBER(S): [id number]   ORDERING CLINICIAN: STEFFEN SIMERLINK   TECHNIQUE: Noncontrast axial CT scan of head was performed. Multiplanar reconstructions   FINDINGS: No acute intracranial hemorrhage, mass effect, midline shift, or herniation. Confluent periventricular and subcortical deep white matter hypodensity suggesting chronic microangiopathic change. Suspected small chronic lacunar infarcts right basal ganglia. No evidence of hydrocephalus. The ventricles and sulci are unremarkable for age. The visualized paranasal sinuses and mastoid air cells are clear. No acute osseous abnormality of the calvarium. No destructive bone lesion. Erosions of the atlantoaxial interval.       No acute intracranial abnormality. Consider follow-up with MRI as warranted.     Signed by: Dottie Archuleta 9/9/2024 4:20 PM Dictation workstation:   [id number]                Assessment/Plan   Assessment & Plan  Altered mental status, unspecified altered mental status type    Porsha Severino is a 89 y.o. female presenting with aphasia.  Patient was recently moved to assisted living about 2 weeks ago.  She is currently accompanied by her daughter who assists with obtaining history.  Daughter endorses that this morning the staff found patient naked in her bedroom and having difficulty getting dressed.  There is also concern that this happened yesterday.  Overall this difficulty getting dressed is new and just started.  Unable to specify what specifically what is the difficulty getting dressed.  This was between 7 to 9 AM.  Her daughter then called her on the phone around 230 today and noticed the patient was having difficulty speaking such as difficulty finding words and difficulty talking.  She also reports the patient was stuttering more and was potentially slightly slurred.   "Once the daughter noticed that she immediately called the facility and requested that they check on the patient, to which she was sent to the hospital here.  The daughter also endorses she did have concerned that the patient did have a vasovagal syncopal event this past Wednesday, to which she has had for events in the past year.        On exam patient is alert, conversive, and cooperative with exam.  She is experiencing some aphasia.  While she is able to communicate, when asked certain questions such as where she is from, how many kids she has, ETC her response is moderately delayed and she does have difficulty responding.  Such as she will say \"I do not know\".  Daughter is at bedside and reports that this is new.  Patient is normally fairly alert and oriented and should be able to recall this information.  She did not know the month or the year, however daughter endorses that this is not new as she has been having some difficulty with remembering time recently.  However the difficulty with speech and answering the above questions is new.  Also during physical exam patient was attempting to talk to this provider but was unable to communicate with what she was saying and was becoming frustrated with inability to express herself.     PMHX hypothyroid, glaucoma     Aphasia  Confusion  -MRI brain neg   -MRA head/neck  -CT head neg  -echo with bubble study  -lipid panel  -telemetry  -neuro consulted, appreciate recs  -Q4 neuro checks   -when asked about where she was born, number of kids, etc there was delayed response and difficulty answering-> unclear if this is more confusion vs aphasia. However she later during exam appeared frustrated as she knew what she wanted to say but was unable to verbalize it which is more indicative of expressive aphasia rather than confusion while answering questions  -given aphasia- higher concern for CVA/TIA rather than hyponatremia causing symptoms-> stroke work up ordered  -symptoms " still present despite sodium improving  -UA neg         Hyponatremia  - on admit-> 132 this AM  -unclear etiology, not on any diuretics, denies poor PO intake, no recent illness, diarrhea, or emesis   -trend      Hypothyroid  -Resume home meds        DVT prophylaxis:  Heparin, SCD     DC plan:  DC home when medically stable     Labs/Testing reviewed     Interdisciplinary team rounding completed with hospitalist, nurse, TCC     NP discussed plan and lab/testing results with Dr. Mason      I spent 45 minutes in the professional and overall care of this patient.      Nalini Lerner, APRN-CNP

## 2024-09-10 NOTE — CONSULTS
INITIAL NEUROLOGY CONSULT NOTE    IMPRESSION:  Overall presentation suggests dementia with mild expressive aphasia, most likely Alzheimer's.  It appears she sundowned in the context of her recent move.   I cannot absolutely rule out TIA, but this is less likely.  I cannot rule out hyponatremia as a component of the current cognitive status.    RECOMMENDATIONS ECASA for stroke prophylaxis, atorvastatin for adjunct stroke prophylaxis.  Correction of mild hyponatremia.  Given the behavioral change, the patient may benefit from placement in ECF.  I will addend this note once I have heard from the patient's daughter.    Lars Milton Jr., M.D., [facility], [place]       History Of Present Illness  Porsha Severino is a 89 y.o. female presenting with altered mentation.  History comes from the patient and from EMR review.  I called her daughter's phone number, but there was no answer and no ability to leave a message.    The patient moved to an assisted living facility two weeks ago because of blindness and memory issues.  She was reported to be more forgetful in the days prior to admission.  She was brought to the ED yesterday because she was found in her apartment completely naked, and confused.  Her speech was reportedly not normal a few hours later, when her daughter called to check on her.  EMS was summoned and she was taken to the [facility] ED.  She was able to tell the ED staff that she felt confused.  She was found to have hyponatremia, and she appeared to perk up after a bolus of normal saline.  The admitting team felt the patient might have mild aphasia as her speech was hesitant.  Cranial MRI was done and revealed no acute pathology, however.  The patient currently denies other focal neurological symptoms including dysarthria, dysphagia, diplopia, focal weakness, focal sensory change, ataxia, vertigo, or bowel/bladder incontinence, among others.      Past Medical History  Past Medical History:   Diagnosis Date    Abdominal  gas pain 07/11/2023    Bilateral impacted cerumen 07/11/2023    Diverticulosis of intestine, part unspecified, without perforation or abscess without bleeding 07/06/2021    Diverticulosis    Frequent urination 07/11/2023    Malignant (primary) neoplasm, unspecified (Multi)     Cancer    Other conditions influencing health status     Adenocarcinoma Of The Uterus    Pain, wrist joint 07/11/2023    Sinusitis 07/11/2023    Urinary tract infection 07/11/2023    Wrist sprain 07/11/2023     Surgical History  Past Surgical History:   Procedure Laterality Date    APPENDECTOMY  03/17/2015    Appendectomy    CATARACT EXTRACTION  06/29/2015    Cataract Surgery    CT ANGIO HEART CORONARY  4/20/2021    CT HEART CORONARY ANGIOGRAM 4/20/2021 [facility]CILL[facility]    HYSTERECTOMY  10/30/2013    Hysterectomy    HYSTEROSCOPY  10/30/2013    Hysteroscopy With Endometrial Ablation    OTHER SURGICAL HISTORY  12/08/2018    Complete colonoscopy    OTHER SURGICAL HISTORY  06/30/2016    Mastoidectomy    TONSILLECTOMY  03/17/2015    Tonsillectomy With Adenoidectomy    TOTAL ABDOMINAL HYSTERECTOMY W/ BILATERAL SALPINGOOPHORECTOMY  03/17/2015    Total Abdominal Hysterectomy With Removal Of Both Ovaries     Social History  Social History     Tobacco Use    Smoking status: Never     Passive exposure: Never    Smokeless tobacco: Never   Substance Use Topics    Alcohol use: Never    Drug use: Never       Scheduled medications  aspirin, 81 mg, oral, Daily  atorvastatin, 40 mg, oral, Nightly  docusate sodium, 100 mg, oral, BID  heparin (porcine), 5,000 Units, subcutaneous, q8h JEFE  pantoprazole, 40 mg, oral, Daily before breakfast  polyethylene glycol, 17 g, oral, Daily  thyroid (pork), 60 mg, oral, q AM  timolol, 1 drop, Right Eye, q AM      Continuous medications     PRN medications  PRN medications: acetaminophen **OR** acetaminophen **OR** acetaminophen, ondansetron **OR** ondansetron, polyethylene glycol      Family History   Problem Relation Name  Age of Onset    COPD Mother       Allergies  Midazolam and Milk  Medications Prior to Admission   Medication Sig Dispense Refill Last Dose    latanoprostene bunod (Vyzulta) 0.024 % drops Administer 1 drop into affected eye(s) at bedtime   Unknown    timolol (Timoptic) 0.5 % ophthalmic solution Administer 1 drop into the right eye once daily.   Unknown    b complex vitamins capsule Take 1 capsule by mouth once daily.   Unknown    cholecalciferol (Vitamin D-3) 50 MCG (2000 UT) tablet Take 1 tablet (2,000 Units) by mouth once daily.   Unknown    coenzyme Q-10 100 mg capsule Take 1 capsule (100 mg) by mouth once daily.   Unknown    fish,bora,flax oils-om3,6,9no1 (Triple Omega 3-6-9) 400-400-400 mg capsule Take 1 capsule by mouth 2 times a day.   Unknown    magnesium oxide (Mag-Ox) 400 mg tablet Take 1 tablet (400 mg) by mouth once daily.   Unknown    thyroid, pork, ([place]) 60 mg tablet Take 1 tablet (60 mg) by mouth once daily. 90 tablet 3 Unknown       Scheduled medications  aspirin, 81 mg, oral, Daily  atorvastatin, 40 mg, oral, Nightly  docusate sodium, 100 mg, oral, BID  heparin (porcine), 5,000 Units, subcutaneous, q8h JEFE  pantoprazole, 40 mg, oral, Daily before breakfast  polyethylene glycol, 17 g, oral, Daily  thyroid (pork), 60 mg, oral, q AM  timolol, 1 drop, Right Eye, q AM      Continuous medications     PRN medications  PRN medications: acetaminophen **OR** acetaminophen **OR** acetaminophen, ondansetron **OR** ondansetron, polyethylene glycol    Review of Systems    Last Recorded Vitals  Blood pressure 146/73, pulse 74, temperature 36.3 °C (97.3 °F), temperature source Temporal, resp. rate 18, height 1.524 m (5'), weight 45.4 kg (100 lb), SpO2 96%.    CONSTITUTIONAL:  No acute distress    CARDIOVASCULAR:  Normal pulses in the distal legs, no edema of either arm or either leg.  No carotid bruits.    MENTAL STATUS:  Awake, alert, oriented to self, but not to place or time, with poor short-term memory (0/3  5-minute recall), no awareness of recent events, poor attention span, concentration, and fund of knowledge.    SPEECH AND LANGUAGE:  Cannot name objects, but can repeat, follows all commands, has no dysarthria    FUNDOSCOPIC:  No papilledema    CRANIAL NERVES:  II-Vision impaired in both eyes; she can see light, and can see some objects if close to her.    III/IV/VI--EOMs are present in all directions.  Pupils are symmetrically reactive in dim light.  No ptosis.    V--Normal facial sensation.    VII--No facial asymmetry.    VIII--Hearing present to voice bilaterally.    IX/X--Symmetric soft palate rise.    XI--Normal trapezius power bilaterally.    XII--Tongue protrudes without deviation.    MOTOR:  Normal power, tone, and bulk in both arms and both legs.    SENSORY:  Present pin sensation in both arms and both legs; her cognitive status will not allow me to determine distal-proximal gradient, asymmetry, or spinal sensory level.    COORDINATION:  Normal finger-to-nose and heel-to-shin testing in both arms and both legs.    REFLEXES are normal and symmetric at the biceps, triceps, brachioradialis, patella, and ankle.  The plantar responses are flexor.    GAIT deferred because of visual impairment.    Relevant Results  Results for orders placed or performed during the hospital encounter of 09/09/24 (from the past 24 hour(s))   CBC and Auto Differential   Result Value Ref Range    WBC 5.9 4.4 - 11.3 x10*3/uL    nRBC 0.0 0.0 - 0.0 /100 WBCs    RBC 4.21 4.00 - 5.20 x10*6/uL    Hemoglobin 12.1 12.0 - 16.0 g/dL    Hematocrit 36.4 36.0 - 46.0 %    MCV 87 80 - 100 fL    MCH 28.7 26.0 - 34.0 pg    MCHC 33.2 32.0 - 36.0 g/dL    RDW 12.5 11.5 - 14.5 %    Platelets 287 150 - 450 x10*3/uL    Neutrophils % 74.7 40.0 - 80.0 %    Immature Granulocytes %, Automated 0.2 0.0 - 0.9 %    Lymphocytes % 13.8 13.0 - 44.0 %    Monocytes % 10.5 2.0 - 10.0 %    Eosinophils % 0.3 0.0 - 6.0 %    Basophils % 0.5 0.0 - 2.0 %    Neutrophils  Absolute 4.40 1.60 - 5.50 x10*3/uL    Immature Granulocytes Absolute, Automated 0.01 0.00 - 0.50 x10*3/uL    Lymphocytes Absolute 0.81 0.80 - 3.00 x10*3/uL    Monocytes Absolute 0.62 0.05 - 0.80 x10*3/uL    Eosinophils Absolute 0.02 0.00 - 0.40 x10*3/uL    Basophils Absolute 0.03 0.00 - 0.10 x10*3/uL   Comprehensive metabolic panel   Result Value Ref Range    Glucose 115 (H) 74 - 99 mg/dL    Sodium 128 (L) 136 - 145 mmol/L    Potassium 3.8 3.5 - 5.3 mmol/L    Chloride 94 (L) 98 - 107 mmol/L    Bicarbonate 22 21 - 32 mmol/L    Anion Gap 16 10 - 20 mmol/L    Urea Nitrogen 18 6 - 23 mg/dL    Creatinine 0.97 0.50 - 1.05 mg/dL    eGFR 55 (L) >60 mL/min/1.73m*2    Calcium 8.9 8.6 - 10.3 mg/dL    Albumin 3.8 3.4 - 5.0 g/dL    Alkaline Phosphatase 78 33 - 136 U/L    Total Protein 6.1 (L) 6.4 - 8.2 g/dL    AST 15 9 - 39 U/L    Bilirubin, Total 1.3 (H) 0.0 - 1.2 mg/dL    ALT 11 7 - 45 U/L   Magnesium   Result Value Ref Range    Magnesium 2.10 1.60 - 2.40 mg/dL   Influenza A, and B PCR   Result Value Ref Range    Flu A Result Not Detected Not Detected    Flu B Result Not Detected Not Detected   Sars-CoV-2 PCR   Result Value Ref Range    Coronavirus 2019, PCR Not Detected Not Detected   Osmolality   Result Value Ref Range    Osmolality, Serum 275 (L) 280 - 300 mOsm/kg   ECG 12 lead   Result Value Ref Range    Ventricular Rate 79 BPM    Atrial Rate 79 BPM    NH Interval 220 ms    QRS Duration 74 ms    QT Interval 384 ms    QTC Calculation(Bazett) 440 ms    P Axis 62 degrees    R Axis 65 degrees    T Axis 68 degrees    QRS Count 13 beats    Q Onset 224 ms    P Onset 114 ms    P Offset 164 ms    T Offset 416 ms    QTC Fredericia 421 ms   Urinalysis with Reflex Culture and Microscopic   Result Value Ref Range    Color, Urine Light-Yellow Light-Yellow, Yellow, Dark-Yellow    Appearance, Urine Clear Clear    Specific Gravity, Urine 1.013 1.005 - 1.035    pH, Urine 6.5 5.0, 5.5, 6.0, 6.5, 7.0, 7.5, 8.0    Protein, Urine NEGATIVE  NEGATIVE, 10 (TRACE), 20 (TRACE) mg/dL    Glucose, Urine Normal Normal mg/dL    Blood, Urine 0.2 (2+) (A) NEGATIVE    Ketones, Urine NEGATIVE NEGATIVE mg/dL    Bilirubin, Urine NEGATIVE NEGATIVE    Urobilinogen, Urine Normal Normal mg/dL    Nitrite, Urine NEGATIVE NEGATIVE    Leukocyte Esterase, Urine NEGATIVE NEGATIVE   Extra Urine Gray Tube   Result Value Ref Range    Extra Tube Hold for add-ons.    Urine electrolytes   Result Value Ref Range    Sodium, Urine Random 48 mmol/L    Sodium/Creatinine Ratio 53 Not established. mmol/g Creat    Potassium, Urine Random 45 mmol/L    Potassium/Creatinine Ratio 50 Not established mmol/g Creat    Chloride, Urine Random 38 mmol/L    Chloride/Creatinine Ratio 42 38 - 318 mmol/g creat    Creatinine, Urine Random 90.4 20.0 - 320.0 mg/dL   Urinalysis Microscopic   Result Value Ref Range    WBC, Urine NONE 1-5, NONE /HPF    RBC, Urine 3-5 NONE, 1-2, 3-5 /HPF    Hyaline Casts, Urine 2+ (A) NONE /LPF   Basic metabolic panel   Result Value Ref Range    Glucose 99 74 - 99 mg/dL    Sodium 132 (L) 136 - 145 mmol/L    Potassium 3.5 3.5 - 5.3 mmol/L    Chloride 98 98 - 107 mmol/L    Bicarbonate 25 21 - 32 mmol/L    Anion Gap 13 10 - 20 mmol/L    Urea Nitrogen 17 6 - 23 mg/dL    Creatinine 0.80 0.50 - 1.05 mg/dL    eGFR 70 >60 mL/min/1.73m*2    Calcium 8.5 (L) 8.6 - 10.3 mg/dL   CBC and Auto Differential   Result Value Ref Range    WBC 4.8 4.4 - 11.3 x10*3/uL    nRBC 0.0 0.0 - 0.0 /100 WBCs    RBC 4.10 4.00 - 5.20 x10*6/uL    Hemoglobin 12.0 12.0 - 16.0 g/dL    Hematocrit 35.2 (L) 36.0 - 46.0 %    MCV 86 80 - 100 fL    MCH 29.3 26.0 - 34.0 pg    MCHC 34.1 32.0 - 36.0 g/dL    RDW 12.5 11.5 - 14.5 %    Platelets 268 150 - 450 x10*3/uL    Neutrophils % 64.2 40.0 - 80.0 %    Immature Granulocytes %, Automated 0.2 0.0 - 0.9 %    Lymphocytes % 21.4 13.0 - 44.0 %    Monocytes % 11.5 2.0 - 10.0 %    Eosinophils % 2.1 0.0 - 6.0 %    Basophils % 0.6 0.0 - 2.0 %    Neutrophils Absolute 3.06 1.60 -  5.50 x10*3/uL    Immature Granulocytes Absolute, Automated 0.01 0.00 - 0.50 x10*3/uL    Lymphocytes Absolute 1.02 0.80 - 3.00 x10*3/uL    Monocytes Absolute 0.55 0.05 - 0.80 x10*3/uL    Eosinophils Absolute 0.10 0.00 - 0.40 x10*3/uL    Basophils Absolute 0.03 0.00 - 0.10 x10*3/uL         I have personally reviewed the following imaging results:  CT head wo IV contrast    Result Date: 9/9/2024  Interpreted By:  Dottie Archuleta, STUDY: CT HEAD WO IV CONTRAST;  9/9/2024 3:55 pm   INDICATION: Signs/Symptoms:AMS.   COMPARISON: None.   ACCESSION NUMBER(S): [id number]   ORDERING CLINICIAN: STEFFEN SIMERLINK   TECHNIQUE: Noncontrast axial CT scan of head was performed. Multiplanar reconstructions   FINDINGS: No acute intracranial hemorrhage, mass effect, midline shift, or herniation. Confluent periventricular and subcortical deep white matter hypodensity suggesting chronic microangiopathic change. Suspected small chronic lacunar infarcts right basal ganglia. No evidence of hydrocephalus. The ventricles and sulci are unremarkable for age. The visualized paranasal sinuses and mastoid air cells are clear. No acute osseous abnormality of the calvarium. No destructive bone lesion. Erosions of the atlantoaxial interval.       No acute intracranial abnormality. Consider follow-up with MRI as warranted.     Signed by: Dottie Archuleta 9/9/2024 4:20 PM Dictation workstation:   [id number]    MR brain wo IV contrast    Result Date: 9/9/2024  Interpreted By:  Vladimir Mason, STUDY: MR BRAIN WO IV CONTRAST; MR ANGIO HEAD WO IV CONTRAST; MR ANGIO NECK WO IV CONTRAST;  9/9/2024 8:55 pm   INDICATION: Signs/Symptoms:aphasia.   COMPARISON: Head CT 09/09/2024.   ACCESSION NUMBER(S): [id number]; [id number]; [id number]   ORDERING CLINICIAN: ELIZABETH WATKINS   TECHNIQUE: Axial T2, FLAIR, DWI, gradient echo T2 and sagittal and coronal T1 weighted images of brain were acquired. Noncontrast time-of-flight MR angiogram of the  waripm-xp-Uozqoy vessels was obtained with MIP reformats. Noncontrast time of flight MR angiogram of the neck vessels was obtained with MIP reformats.   FINDINGS: Brain MRI: There is no evidence of acute infarction. There are no extra-axial collections. There is no mass lesion and no midline shift. There is no hydrocephalus. There is generalized cerebral volume loss and associated ventricular and sulcal enlargement. There are confluence periventricular and deep white matter FLAIR hyperintensities suggestive of moderate chronic microvascular ischemic change. There are also chronic microhemorrhages visualized in the bilateral cerebellar hemispheres suggestive of sequela of small-vessel ischemic disease.   Paranasal Sinuses and Mastoids: Visualized paranasal sinuses are well aerated. The mastoid air cells are clear. The orbits are grossly normal.   MRA of the brain: Anterior circulation: The intracranial portions of the internal carotid, middle cerebral, and anterior cerebral arteries are patent, without significant focal stenosis. There is a normal anterior communicating artery.   Posterior circulation: The intracranial portions of the vertebral arteries are patent. The basilar artery is patent without focal stenosis. The visualized portions of proximal posterior cerebral arteries are patent without focal stenosis. There is a fetal variant left posterior cerebral artery, normal variant.     MRA of the neck: Common carotid arteries: Patent. Left internal carotid: No significant stenosis. Right internal carotid: No significant stenosis. Cervical vertebral arteries: Patent cervical vertebral arteries without focal stenosis.       No evidence of acute infarct, intracranial mass effect or midline shift. Moderate generalized cerebral volume loss and moderate chronic microvascular ischemic change. No evidence of large vessel occlusion or critical stenosis.   Signed by: Vladimir Mason 9/9/2024 9:27 PM Dictation workstation:    [id number]             Lars Milton Jr., M.D., [facility]

## 2024-09-10 NOTE — PROGRESS NOTES
Pharmacy Medication History Review    Porsha Severino is a 89 y.o. female admitted for Altered mental status, unspecified altered mental status type. Pharmacy reviewed the patient's [id number] medications and allergies for accuracy.    The list below reflectives the updated PTA list. Please review each medication in order reconciliation for additional clarification and justification.  Prior to Admission Medications   Prescriptions Last Dose Informant   b complex vitamins capsule Unknown Child   Sig: Take 1 capsule by mouth once daily.   cholecalciferol (Vitamin D-3) 50 MCG (2000 UT) tablet Unknown Child   Sig: Take 1 tablet (2,000 Units) by mouth once daily.   coenzyme Q-10 100 mg capsule Unknown Child   Sig: Take 1 capsule (100 mg) by mouth once daily.   fish,bora,flax oils-om3,6,9no1 (Triple Omega 3-6-9) 400-400-400 mg capsule Unknown Child   Sig: Take 1 capsule by mouth 2 times a day.   latanoprostene bunod (Vyzulta) 0.024 % drops Unknown Child   Sig: Administer 1 drop into affected eye(s) at bedtime   magnesium oxide (Mag-Ox) 400 mg tablet Unknown Child   Sig: Take 1 tablet (400 mg) by mouth once daily.   thyroid, pork, ([place]) 60 mg tablet Unknown Child   Sig: Take 1 tablet (60 mg) by mouth once daily.   timolol (Timoptic) 0.5 % ophthalmic solution Unknown Child   Sig: Administer 1 drop into the right eye once daily.      Facility-Administered Medications: None         The list below reflectives the updated allergy list. Please review each documented allergy for additional clarification and justification.  Allergies  Reviewed by Mariajose Campbell on 9/9/2024        Severity Reactions Comments    Midazolam Not Specified Other     Milk Not Specified Other             Below are additional concerns with the patient's PTA list.  The following updates were made to the Prior to Admission medication list:     Source of Information:     Medications ADDED:   N/A  Medications CHANGED:  N/A  Medications REMOVED:    N/A  Medications NOT TAKING:   N/A    Allergy reviewed : Yes    Comments: SPOKE TO DAUGHTER FOR MED LIST      Mariajose Campbell

## 2024-09-10 NOTE — DISCHARGE SUMMARY
Discharge Diagnosis  Altered mental status, unspecified altered mental status type    Issues Requiring Follow-Up  PCP       Discharge Meds     Medication List      CONTINUE taking these medications     b complex vitamins capsule   cholecalciferol 50 MCG (2000 UT) tablet; Commonly known as: Vitamin D-3   coenzyme Q-10 100 mg capsule   magnesium oxide 400 mg tablet; Commonly known as: Mag-Ox   thyroid (pork) 60 mg tablet; Commonly known as: [place]; Take 1 tablet   (60 mg) by mouth once daily.   timolol 0.5 % ophthalmic solution; Commonly known as: Timoptic   Triple Omega 3-6-9 400-400-400 mg capsule; Generic drug: fish,bora,flax   oils-om3,6,9no1   Vyzulta 0.024 % drops; Generic drug: latanoprostene bunod       Test Results Pending At Discharge  Pending Labs       No current pending labs.            Hospital Course    Porsha Severino is a 89 y.o. female presenting with aphasia.  Patient was recently moved to assisted living about 2 weeks ago.  She is currently accompanied by her daughter who assists with obtaining history.  Daughter endorses that this morning the staff found patient naked in her bedroom and having difficulty getting dressed.  There is also concern that this happened yesterday.  Overall this difficulty getting dressed is new and just started.  Unable to specify what specifically what is the difficulty getting dressed.  This was between 7 to 9 AM.  Her daughter then called her on the phone around 230 today and noticed the patient was having difficulty speaking such as difficulty finding words and difficulty talking.  She also reports the patient was stuttering more and was potentially slightly slurred.  Once the daughter noticed that she immediately called the facility and requested that they check on the patient, to which she was sent to the hospital here.  The daughter also endorses she did have concerned that the patient did have a vasovagal syncopal event this past Wednesday, to which she has had  "for events in the past year.        On exam patient is alert, conversive, and cooperative with exam.  She is experiencing some aphasia.  While she is able to communicate, when asked certain questions such as where she is from, how many kids she has, ETC her response is moderately delayed and she does have difficulty responding.  Such as she will say \"I do not know\".  Daughter is at bedside and reports that this is new.  Patient is normally fairly alert and oriented and should be able to recall this information.  She did not know the month or the year, however daughter endorses that this is not new as she has been having some difficulty with remembering time recently.  However the difficulty with speech and answering the above questions is new.  Also during physical exam patient was attempting to talk to this provider but was unable to communicate with what she was saying and was becoming frustrated with inability to express herself.    She was further admitted to observation. A CT and MRI was completed, all of which were negative for infarct or acute pathology. She remained confused during her stay, requiring a sitter. Neurology did see who believes she dementia, likely underlying alzheimers. She has no prior history of dementia, however given recent fall (trauma) and moving it is likely she is experiencing confusion from moving, and now having some degree of hospital delirium. Given work up benign and neuro recs, stable for DC home. Concern staying longer in hospital would worsen confusion. Did discuss above with daughter, all questions answered     Pertinent Physical Exam At Time of Discharge  Physical Exam    Outpatient Follow-Up  No future appointments.      Nalini Lerner, APRN-CNP  "

## 2024-09-10 NOTE — ED NOTES
Patient out of bed at this time, patient confused and attempting to punch RN. Patient yelling and cursing. Patient was redirected with much encouragement back into bed. Charge RN called at this time for safety sitter.     Gamal Petty RN  09/09/24 [id number]

## 2024-09-11 ENCOUNTER — DOCUMENTATION (OUTPATIENT)
Dept: PRIMARY CARE | Facility: CLINIC | Age: 89
End: 2024-09-11
Payer: MEDICARE

## 2024-09-11 ENCOUNTER — PATIENT OUTREACH (OUTPATIENT)
Dept: PRIMARY CARE | Facility: CLINIC | Age: 89
End: 2024-09-11
Payer: MEDICARE

## 2024-09-11 NOTE — PROGRESS NOTES
Outreach made for hospital discharge follow up. Received phone message from daughter Melani the patient has permanently moved to an assisted living facility and will be cared for by a provider at the facility. TCM program closed.

## 2024-09-14 LAB
ATRIAL RATE: 79 BPM
P AXIS: 62 DEGREES
P OFFSET: 164 MS
P ONSET: 114 MS
PR INTERVAL: 220 MS
Q ONSET: 224 MS
QRS COUNT: 13 BEATS
QRS DURATION: 74 MS
QT INTERVAL: 384 MS
QTC CALCULATION(BAZETT): 440 MS
QTC FREDERICIA: 421 MS
R AXIS: 65 DEGREES
T AXIS: 68 DEGREES
T OFFSET: 416 MS
VENTRICULAR RATE: 79 BPM

## 2024-10-03 ENCOUNTER — LAB (OUTPATIENT)
Dept: LAB | Facility: LAB | Age: 89
End: 2024-10-03
Payer: MEDICARE

## 2024-10-03 DIAGNOSIS — R41.82 ALTERED MENTAL STATUS, UNSPECIFIED ALTERED MENTAL STATUS TYPE: ICD-10-CM

## 2024-10-03 LAB
APPEARANCE UR: ABNORMAL
BILIRUB UR STRIP.AUTO-MCNC: NEGATIVE MG/DL
COLOR UR: ABNORMAL
GLUCOSE UR STRIP.AUTO-MCNC: NORMAL MG/DL
KETONES UR STRIP.AUTO-MCNC: NEGATIVE MG/DL
LEUKOCYTE ESTERASE UR QL STRIP.AUTO: ABNORMAL
NITRITE UR QL STRIP.AUTO: ABNORMAL
PH UR STRIP.AUTO: 6.5 [PH]
PROT UR STRIP.AUTO-MCNC: NEGATIVE MG/DL
RBC # UR STRIP.AUTO: ABNORMAL /UL
RBC #/AREA URNS AUTO: NORMAL /HPF
SP GR UR STRIP.AUTO: 1.01
UROBILINOGEN UR STRIP.AUTO-MCNC: NORMAL MG/DL
WBC #/AREA URNS AUTO: NORMAL /HPF

## 2024-10-03 PROCEDURE — 87186 SC STD MICRODIL/AGAR DIL: CPT

## 2024-10-03 PROCEDURE — 81001 URINALYSIS AUTO W/SCOPE: CPT

## 2024-10-03 PROCEDURE — 87086 URINE CULTURE/COLONY COUNT: CPT

## 2024-10-04 DIAGNOSIS — N30.00 ACUTE CYSTITIS WITHOUT HEMATURIA: Primary | ICD-10-CM

## 2024-10-04 RX ORDER — CIPROFLOXACIN 500 MG/1
500 TABLET ORAL 2 TIMES DAILY
COMMUNITY
End: 2024-10-04 | Stop reason: SDUPTHER

## 2024-10-04 RX ORDER — CIPROFLOXACIN 500 MG/1
500 TABLET ORAL 2 TIMES DAILY
Qty: 10 TABLET | Refills: 0 | Status: SHIPPED | OUTPATIENT
Start: 2024-10-04

## 2024-10-05 LAB — BACTERIA UR CULT: ABNORMAL

## 2024-10-11 PROCEDURE — 80076 HEPATIC FUNCTION PANEL: CPT

## 2024-10-11 PROCEDURE — 80061 LIPID PANEL: CPT

## 2024-10-14 ENCOUNTER — HOSPITAL ENCOUNTER (OUTPATIENT)
Dept: RADIOLOGY | Facility: CLINIC | Age: 89
Discharge: HOME | End: 2024-10-14
Payer: MEDICARE

## 2024-10-14 DIAGNOSIS — R79.89 ABNORMAL LFTS (LIVER FUNCTION TESTS): Primary | ICD-10-CM

## 2024-10-14 DIAGNOSIS — R79.89 ABNORMAL LFTS (LIVER FUNCTION TESTS): ICD-10-CM

## 2024-10-14 PROCEDURE — 74150 CT ABDOMEN W/O CONTRAST: CPT

## 2024-10-14 PROCEDURE — 74150 CT ABDOMEN W/O CONTRAST: CPT | Performed by: RADIOLOGY

## 2024-10-24 ENCOUNTER — LAB REQUISITION (OUTPATIENT)
Dept: LAB | Facility: LAB | Age: 89
End: 2024-10-24
Payer: MEDICARE

## 2024-10-24 DIAGNOSIS — R74.01 ELEVATION OF LEVELS OF LIVER TRANSAMINASE LEVELS: ICD-10-CM

## 2024-10-24 LAB
ALBUMIN SERPL BCP-MCNC: 3.8 G/DL (ref 3.4–5)
ALP SERPL-CCNC: 207 U/L (ref 33–136)
ALT SERPL W P-5'-P-CCNC: 32 U/L (ref 7–45)
AST SERPL W P-5'-P-CCNC: 25 U/L (ref 9–39)
BILIRUB DIRECT SERPL-MCNC: 0.1 MG/DL (ref 0–0.3)
BILIRUB SERPL-MCNC: 0.7 MG/DL (ref 0–1.2)
PROT SERPL-MCNC: 6.5 G/DL (ref 6.4–8.2)

## 2024-10-24 PROCEDURE — 80076 HEPATIC FUNCTION PANEL: CPT

## 2024-11-08 ENCOUNTER — LAB REQUISITION (OUTPATIENT)
Dept: LAB | Facility: LAB | Age: 89
End: 2024-11-08
Payer: MEDICARE

## 2024-11-08 DIAGNOSIS — R79.89 OTHER SPECIFIED ABNORMAL FINDINGS OF BLOOD CHEMISTRY: ICD-10-CM

## 2024-11-08 LAB
ALBUMIN SERPL BCP-MCNC: 4.2 G/DL (ref 3.4–5)
ALP SERPL-CCNC: 125 U/L (ref 33–136)
ALT SERPL W P-5'-P-CCNC: 19 U/L (ref 7–45)
AST SERPL W P-5'-P-CCNC: 20 U/L (ref 9–39)
BILIRUB DIRECT SERPL-MCNC: 0.1 MG/DL (ref 0–0.3)
BILIRUB SERPL-MCNC: 0.8 MG/DL (ref 0–1.2)
PROT SERPL-MCNC: 6.8 G/DL (ref 6.4–8.2)

## 2024-11-08 PROCEDURE — 80076 HEPATIC FUNCTION PANEL: CPT
